# Patient Record
Sex: FEMALE | Race: OTHER | HISPANIC OR LATINO | URBAN - METROPOLITAN AREA
[De-identification: names, ages, dates, MRNs, and addresses within clinical notes are randomized per-mention and may not be internally consistent; named-entity substitution may affect disease eponyms.]

---
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
## 2024-01-01 NOTE — PATIENT PROFILE PEDIATRIC. - NS CRAFFT CAR ALCOHOL
(3) Alterations in Oxygenation (Respiratory Diagnosis, Dehydration, Anemia, Anorexia, Syncope/Dizziness, etc.)
No

## 2024-01-24 ENCOUNTER — RX CHANGE (OUTPATIENT)
Age: 19
End: 2024-01-24

## 2024-01-29 ENCOUNTER — APPOINTMENT (OUTPATIENT)
Dept: PEDIATRIC NEUROLOGY | Facility: CLINIC | Age: 19
End: 2024-01-29
Payer: MEDICAID

## 2024-01-29 PROCEDURE — 96137 PSYCL/NRPSYC TST PHY/QHP EA: CPT

## 2024-01-29 PROCEDURE — 96133 NRPSYC TST EVAL PHYS/QHP EA: CPT

## 2024-01-29 PROCEDURE — 96116 NUBHVL XM PHYS/QHP 1ST HR: CPT

## 2024-01-29 PROCEDURE — 96136 PSYCL/NRPSYC TST PHY/QHP 1ST: CPT

## 2024-01-29 PROCEDURE — 96132 NRPSYC TST EVAL PHYS/QHP 1ST: CPT

## 2024-02-05 ENCOUNTER — TRANSCRIPTION ENCOUNTER (OUTPATIENT)
Age: 19
End: 2024-02-05

## 2024-02-08 ENCOUNTER — TRANSCRIPTION ENCOUNTER (OUTPATIENT)
Age: 19
End: 2024-02-08

## 2024-02-12 ENCOUNTER — TRANSCRIPTION ENCOUNTER (OUTPATIENT)
Age: 19
End: 2024-02-12

## 2024-02-12 ENCOUNTER — INPATIENT (INPATIENT)
Age: 19
LOS: 3 days | Discharge: HOME | End: 2024-02-16
Attending: PEDIATRICS | Admitting: PEDIATRICS
Payer: MEDICAID

## 2024-02-12 VITALS
TEMPERATURE: 98 F | HEART RATE: 88 BPM | SYSTOLIC BLOOD PRESSURE: 117 MMHG | OXYGEN SATURATION: 99 % | WEIGHT: 102.96 LBS | DIASTOLIC BLOOD PRESSURE: 75 MMHG | RESPIRATION RATE: 18 BRPM

## 2024-02-12 DIAGNOSIS — R56.9 UNSPECIFIED CONVULSIONS: ICD-10-CM

## 2024-02-12 LAB
ALBUMIN SERPL ELPH-MCNC: 4.1 G/DL — SIGNIFICANT CHANGE UP (ref 3.3–5)
ALP SERPL-CCNC: 64 U/L — SIGNIFICANT CHANGE UP (ref 40–120)
ALT FLD-CCNC: 21 U/L — SIGNIFICANT CHANGE UP (ref 4–33)
ANION GAP SERPL CALC-SCNC: 10 MMOL/L — SIGNIFICANT CHANGE UP (ref 7–14)
AST SERPL-CCNC: 21 U/L — SIGNIFICANT CHANGE UP (ref 4–32)
BILIRUB SERPL-MCNC: <0.2 MG/DL — SIGNIFICANT CHANGE UP (ref 0.2–1.2)
BUN SERPL-MCNC: 11 MG/DL — SIGNIFICANT CHANGE UP (ref 7–23)
CALCIUM SERPL-MCNC: 8.9 MG/DL — SIGNIFICANT CHANGE UP (ref 8.4–10.5)
CHLORIDE SERPL-SCNC: 103 MMOL/L — SIGNIFICANT CHANGE UP (ref 98–107)
CO2 SERPL-SCNC: 24 MMOL/L — SIGNIFICANT CHANGE UP (ref 22–31)
CREAT SERPL-MCNC: 0.48 MG/DL — LOW (ref 0.5–1.3)
EGFR: 140 ML/MIN/1.73M2 — SIGNIFICANT CHANGE UP
GLUCOSE SERPL-MCNC: 84 MG/DL — SIGNIFICANT CHANGE UP (ref 70–99)
HCT VFR BLD CALC: 34.5 % — SIGNIFICANT CHANGE UP (ref 34.5–45)
HGB BLD-MCNC: 11.4 G/DL — LOW (ref 11.5–15.5)
MCHC RBC-ENTMCNC: 29.8 PG — SIGNIFICANT CHANGE UP (ref 27–34)
MCHC RBC-ENTMCNC: 33 GM/DL — SIGNIFICANT CHANGE UP (ref 32–36)
MCV RBC AUTO: 90.1 FL — SIGNIFICANT CHANGE UP (ref 80–100)
NRBC # BLD: 0 /100 WBCS — SIGNIFICANT CHANGE UP (ref 0–0)
NRBC # FLD: 0 K/UL — SIGNIFICANT CHANGE UP (ref 0–0)
PLATELET # BLD AUTO: 241 K/UL — SIGNIFICANT CHANGE UP (ref 150–400)
POTASSIUM SERPL-MCNC: 3.7 MMOL/L — SIGNIFICANT CHANGE UP (ref 3.5–5.3)
POTASSIUM SERPL-SCNC: 3.7 MMOL/L — SIGNIFICANT CHANGE UP (ref 3.5–5.3)
PROT SERPL-MCNC: 6.4 G/DL — SIGNIFICANT CHANGE UP (ref 6–8.3)
RBC # BLD: 3.83 M/UL — SIGNIFICANT CHANGE UP (ref 3.8–5.2)
RBC # FLD: 14.1 % — SIGNIFICANT CHANGE UP (ref 10.3–14.5)
SODIUM SERPL-SCNC: 137 MMOL/L — SIGNIFICANT CHANGE UP (ref 135–145)
WBC # BLD: 8.16 K/UL — SIGNIFICANT CHANGE UP (ref 3.8–10.5)
WBC # FLD AUTO: 8.16 K/UL — SIGNIFICANT CHANGE UP (ref 3.8–10.5)

## 2024-02-12 PROCEDURE — 99222 1ST HOSP IP/OBS MODERATE 55: CPT

## 2024-02-12 RX ORDER — ESLICARBAZEPINE ACETATE 800 MG/1
1600 TABLET ORAL DAILY
Refills: 0 | Status: DISCONTINUED | OUTPATIENT
Start: 2024-02-13 | End: 2024-02-13

## 2024-02-12 NOTE — H&P PEDIATRIC - NSHPREVIEWOFSYSTEMS_GEN_ALL_CORE
Gen: No fever, normal appetite  Eyes: No eye irritation or discharge  ENT: No ear pain, congestion, sore throat  Resp: No cough or trouble breathing  Cardiovascular: No chest pain or palpitation  Gastroenteric: No nausea/vomiting, diarrhea, constipation  :  No change in urine output; no dysuria  MS: No joint or muscle pain  Skin: No rashes  Neuro: No headache; + abnormal movements  Remainder negative, except as per the HPI

## 2024-02-12 NOTE — H&P PEDIATRIC - NSHPPHYSICALEXAM_GEN_ALL_CORE
GENERAL PHYSICAL EXAM  General:        Well nourished, no acute distress, interactive and talkative on exam  HEENT:         Normocephalic, atraumatic, clear conjunctiva, external ear normal, nasal mucosa normal, oral pharynx clear  Neck:            Supple, full range of motion, no nuchal rigidity  CV:               Warm and well perfused.  Respiratory:   Even, nonlabored breathing  Abdominal:    Soft, non tender, nondistended, no masses, no organomegaly  Extremities:    No joint swelling, erythema, tenderness; normal ROM, no contractures  Skin:              No rash, no neurocutaneous stigmata    NEUROLOGIC EXAM  Mental Status:     Oriented to person, place, and date; Good eye contact; follows simple commands  Cranial Nerves:    PERRL, EOMI, no facial asymmetry, V1-V3 intact , symmetric palate, tongue midline.   Muscle Strength:  Full strength 5/5, proximal and distal,  upper and lower extremities  Muscle Tone:       Normal tone  DTR:                    2+/4 Biceps, Brachioradialis, Triceps Bilateral;  2+/4  Patellar, Ankle bilateral. No clonus.  Babinski:              Plantar reflexes flexion bilaterally  Sensation:            Intact to pain, light touch, temperature and vibration throughout.  Coordination:       No dysmetria in finger to nose test bilaterally  Gait:                    Normal gait, normal tandem gait, normal toe walking, normal heel walking  Romberg:            Negative Romberg Colchicine Counseling:  Patient counseled regarding adverse effects including but not limited to stomach upset (nausea, vomiting, stomach pain, or diarrhea).  Patient instructed to limit alcohol consumption while taking this medication.  Colchicine may reduce blood counts especially with prolonged use.  The patient understands that monitoring of kidney function and blood counts may be required, especially at baseline. The patient verbalized understanding of the proper use and possible adverse effects of colchicine.  All of the patient's questions and concerns were addressed.

## 2024-02-12 NOTE — DISCHARGE NOTE PROVIDER - NSDCMRMEDTOKEN_GEN_ALL_CORE_FT
cholecalciferol 1000 intl units (25 mcg) oral tablet: 1 tab orally once a day while on oxcarbazepine. MDD:1 tab  Diastat AcuDial 20 mg rectal kit: 15 milligrams rectally as needed for seizures lasting longer than 3 to 5 minutes. MDD:15mg  folic acid 1 mg oral tablet: 1 tab(s) orally once a day MDD:1 tab  OXcarbazepine 300 mg oral tablet: Take 1 tab in the morning and 1.5 tabs at bedtime for 3 days, then 1.5 tabs twice daily for 1 week. If seizures persist increase to 1.5 tabs in the morning and 2 tabs at night for 1 week. If seizures persist increase to 2 tabs twice daily. MDD:4 tabs   Diastat AcuDial 20 mg rectal kit: 15 milligrams rectally as needed for seizures lasting longer than 3 to 5 minutes. MDD:15mg  eslicarbazepine 800 mg oral tablet: 2 tab(s) orally once a day

## 2024-02-12 NOTE — DISCHARGE NOTE PROVIDER - NSDCCPCAREPLAN_GEN_ALL_CORE_FT
PRINCIPAL DISCHARGE DIAGNOSIS  Diagnosis: Seizure  Assessment and Plan of Treatment: - Please follow up with neurology at your scheduled outpatient appointment. Please call if there are any questions.   - Please follow up with your Pediatrician in 24-48 hours after discharge from the hospital.   - Please return to the emergency department if patient has any seizure like activity, difficulty talking or walking, or any abnormal mental status concerning for a seizure.  CARE DURING SEIZURES — If you witness your child's seizure, it is important to prevent the child from harming him or herself.  - Place the child on their side to keep the throat clear and allow secretions (saliva or vomit) to drain. Do not try to stop the child's movements or convulsions. Do not put anything in the child's mouth, and do not try to hold the tongue. It is not possible to swallow the tongue, although some children may bite their tongue during a seizure, which can cause bleeding. If this happens, it usually does not cause serious harm.  - Keep an eye on a clock or watch.  - Move the child away from potential hazards, such as a stove, furniture, stairs, or traffic.  - Stay with the child until the seizure ends. Allow the child to sleep after the seizure if he/she is tired. Explain what happened and reassure the child that they are safe when they awaken.  SEIZURE PRECAUTIONS  - Avoid any activity that can result in a fall if the child has a seizure during the activity  - Avoid heights above 3 feet  - If the child is around water, in a tub, or swimming, make sure there is one person responsible for watching the child. If they have a seizure while swimming, they are at risk for drowning     PRINCIPAL DISCHARGE DIAGNOSIS  Diagnosis: Seizure  Assessment and Plan of Treatment: - Please follow up with neurology at your scheduled outpatient appointment. Please call if there are any questions.   - Please follow up with the neurosurgery team.  - Please follow up with your Pediatrician in 24-48 hours after discharge from the hospital.   - Please return to the emergency department if patient has any seizure like activity, difficulty talking or walking, or any abnormal mental status concerning for a seizure.  CARE DURING SEIZURES — If you witness your child's seizure, it is important to prevent the child from harming him or herself.  - Place the child on their side to keep the throat clear and allow secretions (saliva or vomit) to drain. Do not try to stop the child's movements or convulsions. Do not put anything in the child's mouth, and do not try to hold the tongue. It is not possible to swallow the tongue, although some children may bite their tongue during a seizure, which can cause bleeding. If this happens, it usually does not cause serious harm.  - Keep an eye on a clock or watch.  - Move the child away from potential hazards, such as a stove, furniture, stairs, or traffic.  - Stay with the child until the seizure ends. Allow the child to sleep after the seizure if he/she is tired. Explain what happened and reassure the child that they are safe when they awaken.  SEIZURE PRECAUTIONS  - Avoid any activity that can result in a fall if the child has a seizure during the activity  - Avoid heights above 3 feet  - If the child is around water, in a tub, or swimming, make sure there is one person responsible for watching the child. If they have a seizure while swimming, they are at risk for drowning

## 2024-02-12 NOTE — H&P PEDIATRIC - HISTORY OF PRESENT ILLNESS
Sommer is a 17yo RH girl with a history of focal epilepsy presenting for phase 1 vEEG study. Patient first started having seizure around the age of 7, she desribed them as breif changes in sensation to her right should, they were usually breif and she never brought it up to family or doctors. In 2021, patient has an episode of right sided stiffening resulting in a fall. Events used to be rare, but in the last 3 years, the frequency has picked up to 1-2 epispodes a day of varying intensity. Patient states that sometimes it is just the shoulder, but sometimes it will spread from the shoulder to her whole right side including her face. Since being diagnosed with focal epilepsy in 2021 patient has failed oxcarbazepine and oxtellar due to poor seizure control. She is currently on Aptiom 1600mg daily which she has noted has decreased the amount and severity of her seizures. Semiology is RUE and RLE sensory changes, stiffening and sometimes head can turn to the left lasting 10-15 seconds. Triggers are pressure to the right shoulder which can either spark a seizure or cause a small one to spread. 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 PET MRI from June demonstrated focal hypometabolism in the same area. Most recent EEG in december was normal. 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 Patient is developmentally normal, never received speech, PT/Ot. Patient with no recent illnesses or head traumas. Family history notable for an aneurysm in grandma. Patient seen by neuropsych pending report.

## 2024-02-12 NOTE — DISCHARGE NOTE PROVIDER - HOSPITAL COURSE
[employer] for authorization of approval for Bilateral L2, L3, L4 and L5 medial branch blocks cpt codes 91552-71, 28058-RV, 34177;LT, 24144-DC, 51205-BJ.   Approved with WEB ID   [id number] for one unit/DOS effective 03/02/21 to 04/16/21 leonela Sommer is a 17yo RH girl with a history of focal epilepsy presenting for phase 1 vEEG study. Patient first started having seizure around the age of 7, she desribed them as breif changes in sensation to her right should, they were usually breif and she never brought it up to family or doctors. In 2021, patient has an episode of right sided stiffening resulting in a fall. Events used to be rare, but in the last 3 years, the frequency has picked up to 1-2 epispodes a day of varying intensity. Patient states that sometimes it is just the shoulder, but sometimes it will spread from the shoulder to her whole right side including her face. Since being diagnosed with focal epilepsy in 2021 patient has failed oxcarbazepine and oxtellar due to poor seizure control. She is currently on Aptiom 1600mg daily which she has noted has decreased the amount and severity of her seizures. Semiology is RUE and RLE sensory changes, stiffening and sometimes head can turn to the left lasting 10-15 seconds. Triggers are pressure to the right shoulder which can either spark a seizure or cause a small one to spread. 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 PET MRI from June demonstrated focal hypometabolism in the same area. Most recent EEG in december was normal. 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 Patient is developmentally normal, never received speech, PT/Ot. Patient with no recent illnesses or head traumas. Family history notable for an aneurysm in grandma. Patient seen by neuropsych pending report.     Neuroscience Course (2/12-)  Patient arrived to the floor in stable condition. vEEG was hooked up on 2/12 and disconnected on ... EEG demonstrated...     On day of discharge, vital signs were reviewed and remained within acceptable range. The patient continued to tolerate oral intake with adequate output. The patient remained well-appearing, with no (new) concerning findings noted on physical exam. Care plan, expected course, anticipatory guidance, and strict return precautions discussed in great detail with caregivers, who endorsed understanding. Questions and concerns at the time were addressed. The patient was deemed stable for discharge home with recommended follow-up with their primary care physician in 1-2 days.     <<No medications indicated at time of discharge. Patient may resume all outpatient therapies without restrictions.>>     Discharge Vitals     Discharge Physical Sommer is a 17yo RH girl with a history of focal epilepsy presenting for phase 1 vEEG study. Patient first started having seizure around the age of 7, she desribed them as breif changes in sensation to her right should, they were usually breif and she never brought it up to family or doctors. In 2021, patient has an episode of right sided stiffening resulting in a fall. Events used to be rare, but in the last 3 years, the frequency has picked up to 1-2 epispodes a day of varying intensity. Patient states that sometimes it is just the shoulder, but sometimes it will spread from the shoulder to her whole right side including her face. Since being diagnosed with focal epilepsy in 2021 patient has failed oxcarbazepine and oxtellar due to poor seizure control. She is currently on Aptiom 1600mg daily which she has noted has decreased the amount and severity of her seizures. Semiology is RUE and RLE sensory changes, stiffening and sometimes head can turn to the left lasting 10-15 seconds. Triggers are pressure to the right shoulder which can either spark a seizure or cause a small one to spread. 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 PET MRI from June demonstrated focal hypometabolism in the same area. Most recent EEG in december was normal. 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 Patient is developmentally normal, never received speech, PT/Ot. Patient with no recent illnesses or head traumas. Family history notable for an aneurysm in grandma. Patient seen by neuropsych pending report.     Neuroscience Course (2/12-2/16)  Patient arrived to the floor in stable condition. vEEG was hooked up on 2/12 and disconnected on 2/15. EEG demonstrated multiple poorly localized electroclinical seizures, diagnostic of a focal epilepsy arising from possibly the left posterior quadrant. In addition, the EEG findings indicate a focal epileptic diathesis over the posterior vertex region consistent with the interictal manifestation of a focal epilepsy in the appropriate clinical setting. She experienced a 2-min GTC on 2/15 after being off aptiom for 24 hours. She was restarted on aptiom 1600 mg daily on 2/15 and observed for clinical seizure activity until discharge on 2/16. Plan to followup outpatient with neurology an neurosurgery.    On day of discharge, vital signs were reviewed and remained within acceptable range. The patient continued to tolerate oral intake with adequate output. The patient remained well-appearing, with no (new) concerning findings noted on physical exam. Care plan, expected course, anticipatory guidance, and strict return precautions discussed in great detail with caregivers, who endorsed understanding. Questions and concerns at the time were addressed. The patient was deemed stable for discharge home with recommended follow-up with their primary care physician in 1-2 days.     Medications: Aptiom 1600mg daily    Discharge Vitals   VS  T(C): 37 (02-16-24 @ 06:00)  HR: 66 (02-16-24 @ 06:00)  BP: 102/83 (02-16-24 @ 06:00)  RR: 18 (02-16-24 @ 06:00)  SpO2: 100% (02-16-24 @ 06:00)    Discharge Physical     Gen:  Alert and interactive, no acute distress  HEENT: Normocephalic, atraumatic; PERRLA, Moist mucosa; Oropharynx clear; Neck supple  Lymph: No significant lymphadenopathy  CV: Heart regular, normal S1/2, no murmurs; Extremities warm and well-perfused x4  Pulm: Lungs clear to auscultation bilaterally  GI: Abdomen non-distended; No organomegaly, no tenderness, no masses  Skin: No rash noted  Neuro: Alert; Normal tone; CN II-XII grossly intact, normal strength in all 4 extremities   Sommer is a 19yo RH girl with a history of focal epilepsy presenting for phase 1 vEEG study. Patient first started having seizure around the age of 7, she desribed them as breif changes in sensation to her right should, they were usually breif and she never brought it up to family or doctors. In 2021, patient has an episode of right sided stiffening resulting in a fall. Events used to be rare, but in the last 3 years, the frequency has picked up to 1-2 epispodes a day of varying intensity. Patient states that sometimes it is just the shoulder, but sometimes it will spread from the shoulder to her whole right side including her face. Since being diagnosed with focal epilepsy in 2021 patient has failed oxcarbazepine and oxtellar due to poor seizure control. She is currently on Aptiom 1600mg daily which she has noted has decreased the amount and severity of her seizures. Semiology is RUE and RLE sensory changes, stiffening and sometimes head can turn to the left lasting 10-15 seconds. Triggers are pressure to the right shoulder which can either spark a seizure or cause a small one to spread. 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 PET MRI from June demonstrated focal hypometabolism in the same area. Most recent EEG in december was normal. 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 Patient is developmentally normal, never received speech, PT/Ot. Patient with no recent illnesses or head traumas. Family history notable for an aneurysm in grandma. Patient seen by neuropsych pending report.     Neuroscience Course (2/12-2/16)  Patient arrived to the floor in stable condition. vEEG was hooked up on 2/12 and disconnected on 2/15. EEG demonstrated multiple poorly localized electroclinical seizures, diagnostic of a focal epilepsy arising from possibly the left posterior quadrant. In addition, the EEG findings indicate a focal epileptic diathesis over the posterior vertex region consistent with the interictal manifestation of a focal epilepsy in the appropriate clinical setting. She experienced a 2-min GTC on 2/15 after being off aptiom for 24 hours. She was restarted on aptiom 1600 mg daily on 2/15 and observed for clinical seizure activity until discharge on 2/16. Plan to followup outpatient with neurology an neurosurgery.    On day of discharge, vital signs were reviewed and remained within acceptable range. The patient continued to tolerate oral intake with adequate output. The patient remained well-appearing, with no (new) concerning findings noted on physical exam. Care plan, expected course, anticipatory guidance, and strict return precautions discussed in great detail with caregivers, who endorsed understanding. Questions and concerns at the time were addressed. The patient was deemed stable for discharge home with recommended follow-up with their primary care physician in 1-2 days.     Medications: Aptiom 1600mg daily    Discharge Vitals   VS  T(C): 37 (02-16-24 @ 06:00)  HR: 66 (02-16-24 @ 06:00)  BP: 102/83 (02-16-24 @ 06:00)  RR: 18 (02-16-24 @ 06:00)  SpO2: 100% (02-16-24 @ 06:00)    Discharge Physical     Gen:  Alert and interactive, no acute distress  HEENT: Normocephalic, atraumatic; PERRLA, Moist mucosa; Oropharynx clear; Neck supple  Lymph: No significant lymphadenopathy  CV: Heart regular, normal S1/2, no murmurs; Extremities warm and well-perfused x4  Pulm: Lungs clear to auscultation bilaterally  GI: Abdomen non-distended; No organomegaly, no tenderness, no masses  Skin: No rash noted  Neuro: Alert; Normal tone; CN II-XII grossly intact, normal strength in all 4 extremities    Interval history was reviewed with patient and/or family (caretakers) and/or nursing and/or house staff as appropriate. Neurological examination was performed.  Any paraclinical testing performed in the interval was reviewed including laboratory studies, electroencephalographic recordings and neuroimaging if performed. Diagnostic and treatment plan was discussed with patient, and/or family (caretakers),and/or house staff and/or nursing as appropriate.     I was physically present for key portions of the evaluation and management (E/M) service provided. I agree with the history, physical examination, assessment and plan as written. All edits/revisions/additions were made to the document.    Jerry West MD  Attending Physician  Pediatric Neurology/Epilepsy

## 2024-02-12 NOTE — H&P PEDIATRIC - ASSESSMENT
Sommer is a 19yo girl with history of focal epilepsy refractory to medications presenting for phase 1 vEEG study. Patient is currently managed on aptiom 1600mg daily with seizures occurring daily. Patient is clinically stable with a nonfocal neurologic examination. Patient with Left sided signal abnormality in the pars marginalis and focal hypometabolism signal  aberration in the left pars marginalis on PET MRI. Patient to be admitted for vEEG for surgical planning.     Plan:    Focal seizures:  [ ]Aptiom 1600mg daily at 10AM  [ ]Ativan 4mg prn for seizures lasting longer than 5 minutes  [ ]seizure precautions  [ ]1:1 supervision during admission  [ ]cont pulse ox  [ ]cardiac bedside monitor    FENGI:   [ ]regular diet    Patient discussed with Dr. West, Pediatric Neurology Attending  Plan not final until signed by attending

## 2024-02-12 NOTE — DISCHARGE NOTE PROVIDER - CARE PROVIDER_API CALL
Jerry West  Pediatric Neurology  [address] [facility], [address]  [place] [address]  Phone: [phone]  Fax: [phone]  Follow Up Time: 2 weeks

## 2024-02-13 PROCEDURE — 99232 SBSQ HOSP IP/OBS MODERATE 35: CPT

## 2024-02-13 PROCEDURE — 95720 EEG PHY/QHP EA INCR W/VEEG: CPT

## 2024-02-13 RX ORDER — ESLICARBAZEPINE ACETATE 800 MG/1
800 TABLET ORAL DAILY
Refills: 0 | Status: DISCONTINUED | OUTPATIENT
Start: 2024-02-13 | End: 2024-02-14

## 2024-02-13 RX ADMIN — ESLICARBAZEPINE ACETATE 800 MILLIGRAM(S): 800 TABLET ORAL at 10:57

## 2024-02-13 NOTE — EEG REPORT - NS EEG TEXT BOX
Patient Identifiers  Name: CHELO MICHELLE  : 04  Age: 18y Female    Start Time: 24 21:13  End Time: 24 08:00    History:      Phase I evaluation, pars marginalis FCD    Medications:   eslicarbazepine Oral Tab/Cap - Peds 1600 milliGRAM(s) Oral daily  LORazepam IV Push - Peds 4 milliGRAM(s) IV Push once PRN    ___________________________________________________________________________  Recording Technique:     The patient underwent continuous Video/EEG monitoring using a cable telemetry system [employer].  The EEG was recorded from 21 electrodes using the standard 10/20 placement, with EKG.  Time synchronized digital video recording was done simultaneously with EEG recording.    The EEG was continuously sampled on disk, and spike detection and seizure detection algorithms marked portions of the EEG for further analysis offline.  Video data was stored on disk for important clinical events (indicated by manual pushbutton) and for periods identified by the seizure detection algorithm, and analyzed offline.      Video and EEG data were reviewed by the electroencephalographer on a daily basis, and selected segments were archived on compact disc.      The patient was attended by an EEG technician for eight to ten hours per day.  Patients were observed by the epilepsy nursing staff 24 hours per day.  The epilepsy center neurologist was available in person or on call 24 hours per day during the period of monitoring.    ___________________________________________________________________________    Background in wakefulness:   The background activity during wakefulness was well organized and characterized by the presence of well-modulated 10-11 Hz rhythm that appeared symmetrically over both posterior hemispheres and was attenuated with eye opening. A normal anterior to posterior gradient was present.    Background in drowsiness/sleep:  As the patient became drowsy, there was an attenuation of the background and the appearance of widespread, irregular slower frequency activity.  Stage II sleep was marked by synchronous age appropriate spindles. Normal slow wave sleep was achieved.     Slowing:  No focal slowing was present. No generalized slowing was present.     Attenuation and Asymmetry: None.    Interictal Activity:  -Occasional, sleep potentiated posterior vertex (Cz/Pz) spikes with spread at times to the left central (C3) region.      Patient Events/ Ictal Activity: One electroclinical seizure was captured during this recording at 05:01:27. Onset of seizure was characterized by 2-3Hz Cz/Pz spiking for 6 seconds preceding a burst of low amplitude fast activity over the bilateral posterior hemispheres (T5/O1, T6/O2, Cz/Pz) for 2 seconds with subsequent diffuse muscle artifact on EEG correlating with an arousal from sleep and reaching for the button. On further clarification, patient endorses having a painful sensation over the RUE during this period.    Activation Procedures:  None.    EKG:  No clear abnormalities were noted.     Impression:  This is an abnormal EEG due to the following findings:   -One electroclinical seizure as described above, focal onset arising from posterior vertex.   -Occasional, sleep potentiated posterior vertex (Cz/Pz) spikes with spread at times to the left central (C3) region.     Clinical Correlation:  The findings of this EEG are diagnostic of a focal epilepsy arising from the posterior vertex region. In addition, the EEG findings indicate a focal epileptic diathesis over this posterior vertex region consistent with the interictal manifestation of a focal epilepsy in the appropriate clinical setting.     Salinas Kelly MD  PGY-6, Pediatric Epilepsy Fellow    ***THIS IS A PRELIMINARY FELLOW REPORT PENDING REVIEW WITH ATTENDING EPILEPTOLOGIST***     Patient Identifiers  Name: CHELO MICHELLE  : 04  Age: 18y Female    Start Time: 24 21:13  End Time: 24 08:00    History:      Phase I evaluation, pars marginalis FCD    Medications:   eslicarbazepine Oral Tab/Cap - Peds 1600 milliGRAM(s) Oral daily  LORazepam IV Push - Peds 4 milliGRAM(s) IV Push once PRN    ___________________________________________________________________________  Recording Technique:     The patient underwent continuous Video/EEG monitoring using a cable telemetry system [employer].  The EEG was recorded from 21 electrodes using the standard 10/20 placement, with EKG.  Time synchronized digital video recording was done simultaneously with EEG recording.    The EEG was continuously sampled on disk, and spike detection and seizure detection algorithms marked portions of the EEG for further analysis offline.  Video data was stored on disk for important clinical events (indicated by manual pushbutton) and for periods identified by the seizure detection algorithm, and analyzed offline.      Video and EEG data were reviewed by the electroencephalographer on a daily basis, and selected segments were archived on compact disc.      The patient was attended by an EEG technician for eight to ten hours per day.  Patients were observed by the epilepsy nursing staff 24 hours per day.  The epilepsy center neurologist was available in person or on call 24 hours per day during the period of monitoring.    ___________________________________________________________________________    Background in wakefulness:   The background activity during wakefulness was well organized and characterized by the presence of well-modulated 10-11 Hz rhythm that appeared symmetrically over both posterior hemispheres and was attenuated with eye opening. A normal anterior to posterior gradient was present.    Background in drowsiness/sleep:  As the patient became drowsy, there was an attenuation of the background and the appearance of widespread, irregular slower frequency activity.  Stage II sleep was marked by synchronous age appropriate spindles. Normal slow wave sleep was achieved.     Slowing:  No focal slowing was present. No generalized slowing was present.     Attenuation and Asymmetry: None.    Interictal Activity:  -Occasional, sleep potentiated posterior vertex (Cz/Pz) spikes with spread at times to the left central (C3) region.      Patient Events/ Ictal Activity: One electroclinical seizure was captured during this recording at 05:01:27. Onset of seizure was characterized by 2-3Hz Cz/Pz spiking for 6 seconds preceding a burst of low amplitude fast activity over the bilateral posterior hemispheres (T5/O1, T6/O2, Cz/Pz) for 2 seconds with subsequent diffuse muscle artifact on EEG correlating with an arousal from sleep and reaching for the button. On further clarification, patient endorses having a painful sensation over the RUE during this period.    Activation Procedures:  None.    EKG:  No clear abnormalities were noted.     Impression:  This is an abnormal EEG due to the following findings:   -One electroclinical seizure as described above, focal onset arising from posterior vertex.   -Occasional, sleep potentiated posterior vertex (Cz/Pz) spikes with spread at times to the left central (C3) region.     Clinical Correlation:  The findings of this EEG are diagnostic of a focal epilepsy arising from the posterior vertex region. In addition, the EEG findings indicate a focal epileptic diathesis over this posterior vertex region consistent with the interictal manifestation of a focal epilepsy in the appropriate clinical setting.     Salinas Kelly MD  PGY-6, Pediatric Epilepsy Fellow    Jerry West MD  Attending Physician   Pediatric Neurology/Epilepsy

## 2024-02-13 NOTE — PATIENT PROFILE PEDIATRIC - URINARY CATHETER
[address]    Name:  Dennys Carpenter  MR#:  [id number]  :  2010  ACCOUNT #:  [de-identified]  DATE OF SERVICE:  2020    Thank you for the opportunity to participate in the care of this patient. HISTORY OF PRESENT ILLNESS:  As you know, she is an 5year-old female who presented with some pitting edema which may be related to restrictive eating habits. I was consulted to discuss treatment options and diagnostic possibilities with the patient and her family. I met with both patient and her mother. They report that the patient began restricting sugar intake a few months ago and that this progressed to not eating her lunch. She has lost about 7 pounds in the interim. She is also pacing frequently. The patient specifically denies any concerns about being overweight. She states that she believes she is actually underweight and seems reasonably credible in this claim. She denies any clothing behaviors. She denies any stomach pain or other gastrointestinal distress when eating. The forgoing of the lunch began with a change in schedule at school which had her eating earlier. She also expressed concerns about not eating snacks because it may spoil her dinner. She also expressed rather unusual concerns about avoiding screens because they may \"interfere with her ability to concentrate. \"  She is anxious about doing well in her 6th grade and is afraid that screen time will disrupt her ability to concentrate. This is a substantial change in her previous pattern according to her mother as she is like most people these days had been spending quite a bit of time in front of the screen. There were no reports of self-injury. She denies any suicidal or homicidal thinking. There were no reports of suicidal threats. There are patterns of obsessional thinking. The patient will often sit in her room and \"think. \"  She becomes upset and agitated if her mother interrupts.   The patient denies that there is any thinking to prevent bad things from happening or such that she has to restart her thought patterns, but I suspect that this may not be entirely accurate. MENTAL STATUS EXAMINATION:  This is a thin, young female. She is cooperative, oriented. There is no agitation. Her mood is [place]. \"  Her affect is bright. She is engaging. Speech is normal in rate, tone, and volume. Thought process is linear, logical, and goal directed. Cognitive function is age appropriate. Memory is intact. DIAGNOSES:  Unspecified eating disorder, unspecified anxiety disorder, rule out obsessive-compulsive disorder, rule out anorexia nervosa. TREATMENT AND RECOMMENDATIONS:  Given the patient's lack of concern about being overweight, a classic diagnosis of anorexia is unlikely, although there are certainly elements of this. She also has a number of other anxiety problems but many of these are of an obsessional nature. Her mother expressed concerns about PANDAS which of course is on the differential, though certainly less likely than a normal anxiety disorder. The mother minimizes a family history of anxiety. We discussed some treatment options including therapy and medications. They are interested in pursuing at least therapy at this time. We discussed the risks and potential benefits of medications, and she was encouraged to speak up with a child psychiatrist or similar provider. I gave her several suggestions such as Dr. Viki Jarvis with [facility], practitioner in my group [facility] and Nella Alcantar with [facility]. Presently, there is no criteria for acute psychiatric hospitalization. Thank you for the consultation. Should you have further questions, please feel free to contact.       Cullen Simental MD      [id number]  D:  11/26/2020 18:59  T:  11/27/2020 0:53  JOB #:  [id number] no

## 2024-02-14 PROCEDURE — 99232 SBSQ HOSP IP/OBS MODERATE 35: CPT

## 2024-02-14 PROCEDURE — 95720 EEG PHY/QHP EA INCR W/VEEG: CPT

## 2024-02-14 RX ORDER — ESLICARBAZEPINE ACETATE 800 MG/1
1600 TABLET ORAL DAILY
Refills: 0 | Status: DISCONTINUED | OUTPATIENT
Start: 2024-02-15 | End: 2024-02-16

## 2024-02-14 NOTE — EEG REPORT - NS EEG TEXT BOX
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  The EEG was recorded from 21 electrodes using the standard 10/20 placement, with EKG.  Time synchronized digital video recording was done simultaneously with EEG recording.    The EEG was continuously sampled on disk, and spike detection and seizure detection algorithms marked portions of the EEG for further analysis offline.  Video data was stored on disk for important clinical events (indicated by manual pushbutton) and for periods identified by the seizure detection algorithm, and analyzed offline.      Video and EEG data were reviewed by the electroencephalographer on a daily basis, and selected segments were archived on compact disc.      The patient was attended by an EEG technician for eight to ten hours per day.  Patients were observed by the epilepsy nursing staff 24 hours per day.  The epilepsy center neurologist was available in person or on call 24 hours per day during the period of monitoring.    ___________________________________________________________________________    Background in wakefulness:   The background activity during wakefulness was well organized and characterized by the presence of well-modulated 10-11 Hz rhythm that appeared symmetrically over both posterior hemispheres and was attenuated with eye opening. A normal anterior to posterior gradient was present.    Background in drowsiness/sleep:  As the patient became drowsy, there was an attenuation of the background and the appearance of widespread, irregular slower frequency activity.  Stage II sleep was marked by synchronous age appropriate spindles. Normal slow wave sleep was achieved.     Slowing:  No focal slowing was present. No generalized slowing was present.     Attenuation and Asymmetry: None.    Interictal Activity:  -Occasional, sleep potentiated posterior vertex (Cz/Pz) spikes with spread at times to the left central (C3) region.      Patient Events/ Ictal Activity: Eight electroclinical seizures was captured during this recording. Onset of seizure was often poorly lateralizable, with low voltage fast activity present over the bilateral posterior quadrants (T5/O1, O2)  that evolve, at times, to 5-6Hz periodic discharges over these regions and subsequent presence of diffuse muscle artifact thereafter. These episodes correlated with right hemibody numbness or shoulder numbness.     Activation Procedures:  None.    EKG:  No clear abnormalities were noted.     Impression:  This is an abnormal EEG due to the following findings:   -Eight electroclinical seizures as described above, focal onset arising from the posterior hemisphere, possible prominence over the left posterior quadrant.   -Occasional, sleep potentiated posterior vertex (Cz/Pz) spikes with spread at times to the left central (C3) region.     Clinical Correlation:  The findings of this EEG are diagnostic of a focal epilepsy arising from possibly the left posterior quadrant. In addition, the EEG findings indicate a focal epileptic diathesis over the posterior vertex region consistent with the interictal manifestation of a focal epilepsy in the appropriate clinical setting.     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  The EEG was recorded from 21 electrodes using the standard 10/20 placement, with EKG.  Time synchronized digital video recording was done simultaneously with EEG recording.    The EEG was continuously sampled on disk, and spike detection and seizure detection algorithms marked portions of the EEG for further analysis offline.  Video data was stored on disk for important clinical events (indicated by manual pushbutton) and for periods identified by the seizure detection algorithm, and analyzed offline.      Video and EEG data were reviewed by the electroencephalographer on a daily basis, and selected segments were archived on compact disc.      The patient was attended by an EEG technician for eight to ten hours per day.  Patients were observed by the epilepsy nursing staff 24 hours per day.  The epilepsy center neurologist was available in person or on call 24 hours per day during the period of monitoring.    ___________________________________________________________________________    Background in wakefulness:   The background activity during wakefulness was well organized and characterized by the presence of well-modulated 10-11 Hz rhythm that appeared symmetrically over both posterior hemispheres and was attenuated with eye opening. A normal anterior to posterior gradient was present.    Background in drowsiness/sleep:  As the patient became drowsy, there was an attenuation of the background and the appearance of widespread, irregular slower frequency activity.  Stage II sleep was marked by synchronous age appropriate spindles. Normal slow wave sleep was achieved.     Slowing:  No focal slowing was present. No generalized slowing was present.     Attenuation and Asymmetry: None.    Interictal Activity:  -Occasional, sleep potentiated posterior vertex (Cz/Pz) spikes with spread at times to the left central (C3) region.      Patient Events/ Ictal Activity: Eight electroclinical seizures was captured during this recording. Seven of these seizures have poorly lateralizable onset, with diffuse muscle activity present during the seizures. These episodes correlated with right hemibody numbness or shoulder numbness. One electroclinical seizure (see 02:27) presented with low voltage fast activity present over the bilateral posterior quadrants (T5/O1, O2)  that evolved in amplitude and subsequent presence of diffuse muscle artifact thereafter correlating with her eyes opening, gaze to the left with dystonic posturing of the LUE and tonic stiffening of the RUE (figure of 4 posturing) prior to offset. Sensory symptoms were also endorsed during this episode.     Activation Procedures:  None.    EKG:  No clear abnormalities were noted.     Impression:  This is an abnormal EEG due to the following findings:   -Eight electroclinical seizures as described above, poorly localized and lateralized, possible prominence over the left posterior quadrant (based on 02:27 episode).    -Occasional, sleep potentiated posterior vertex (Cz/Pz) spikes with spread at times to the left central (C3) region.     Clinical Correlation:  The findings of this EEG are diagnostic of a focal epilepsy arising from possibly the left posterior quadrant. In addition, the EEG findings indicate a focal epileptic diathesis over the posterior vertex region consistent with the interictal manifestation of a focal epilepsy in the appropriate clinical setting.     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  The EEG was recorded from 21 electrodes using the standard 10/20 placement, with EKG.  Time synchronized digital video recording was done simultaneously with EEG recording.    The EEG was continuously sampled on disk, and spike detection and seizure detection algorithms marked portions of the EEG for further analysis offline.  Video data was stored on disk for important clinical events (indicated by manual pushbutton) and for periods identified by the seizure detection algorithm, and analyzed offline.      Video and EEG data were reviewed by the electroencephalographer on a daily basis, and selected segments were archived on compact disc.      The patient was attended by an EEG technician for eight to ten hours per day.  Patients were observed by the epilepsy nursing staff 24 hours per day.  The epilepsy center neurologist was available in person or on call 24 hours per day during the period of monitoring.    ___________________________________________________________________________    Background in wakefulness:   The background activity during wakefulness was well organized and characterized by the presence of well-modulated 10-11 Hz rhythm that appeared symmetrically over both posterior hemispheres and was attenuated with eye opening. A normal anterior to posterior gradient was present.    Background in drowsiness/sleep:  As the patient became drowsy, there was an attenuation of the background and the appearance of widespread, irregular slower frequency activity.  Stage II sleep was marked by synchronous age appropriate spindles. Normal slow wave sleep was achieved.     Slowing:  No focal slowing was present. No generalized slowing was present.     Attenuation and Asymmetry: None.    Interictal Activity:  -Occasional, sleep potentiated posterior vertex (Cz/Pz) spikes with spread at times to the left central (C3) region.      Patient Events/ Ictal Activity: Eight electroclinical seizures was captured during this recording. Seven of these seizures have poorly lateralizable onset, with diffuse muscle activity present during the seizures. These episodes correlated with right hemibody numbness or shoulder numbness. One electroclinical seizure (see 02:27) presented with low voltage fast activity present over the bilateral posterior quadrants (T5/O1, O2)  that evolved in amplitude and subsequent presence of diffuse muscle artifact thereafter correlating with her eyes opening, gaze to the left with dystonic posturing of the LUE and tonic stiffening of the RUE (figure of 4 posturing) prior to offset. Sensory symptoms were also endorsed during this episode.     Activation Procedures:  None.    EKG:  No clear abnormalities were noted.     Impression:  This is an abnormal EEG due to the following findings:   -Eight electroclinical seizures as described above, poorly localized and lateralized, possible prominence over the left posterior quadrant (based on 02:27 episode).    -Occasional, sleep potentiated posterior vertex (Cz/Pz) spikes with spread at times to the left central (C3) region.     Clinical Correlation:  The findings of this EEG are diagnostic of a focal epilepsy arising from possibly the left posterior quadrant. In addition, the EEG findings indicate a focal epileptic diathesis over the posterior vertex region consistent with the interictal manifestation of a focal epilepsy in the appropriate clinical setting.     Salinas Kelly MD  PGY-6, Pediatric Epilepsy Fellow    Jerry West MD  Attending Physician   Pediatric Neurology/Epilepsy

## 2024-02-15 ENCOUNTER — NON-APPOINTMENT (OUTPATIENT)
Age: 19
End: 2024-02-15

## 2024-02-15 PROCEDURE — 95720 EEG PHY/QHP EA INCR W/VEEG: CPT

## 2024-02-15 PROCEDURE — 99232 SBSQ HOSP IP/OBS MODERATE 35: CPT

## 2024-02-15 RX ORDER — ONDANSETRON 8 MG/1
4 TABLET, FILM COATED ORAL ONCE
Refills: 0 | Status: COMPLETED | OUTPATIENT
Start: 2024-02-15 | End: 2024-02-15

## 2024-02-15 RX ADMIN — ONDANSETRON 8 MILLIGRAM(S): 8 TABLET, FILM COATED ORAL at 21:50

## 2024-02-15 RX ADMIN — ESLICARBAZEPINE ACETATE 1600 MILLIGRAM(S): 800 TABLET ORAL at 10:11

## 2024-02-15 RX ADMIN — Medication 4 MILLIGRAM(S): at 06:40

## 2024-02-15 NOTE — PROGRESS NOTE PEDS - SUBJECTIVE AND OBJECTIVE BOX
Reason for Visit: Patient is a 18y old  Female who presents with a chief complaint of phase 1 vEEG study (14 Feb 2024 13:48)    Interval History/ROS: Patient experienced a GTC lasting 2 minutes this morning and was postictal afterward. She received 2 mg ativan IV push. otherwise doing well.    MEDICATIONS  (STANDING):  eslicarbazepine Oral Tab/Cap - Peds 1600 milliGRAM(s) Oral daily  LORazepam IV Push - Peds 2 milliGRAM(s) IV Push once    MEDICATIONS  (PRN):    Allergies    No Known Allergies    Intolerances          Vital Signs Last 24 Hrs  T(C): 37 (15 Feb 2024 13:35), Max: 37.2 (14 Feb 2024 22:04)  T(F): 98.6 (15 Feb 2024 13:35), Max: 98.9 (14 Feb 2024 22:04)  HR: 90 (15 Feb 2024 13:35) (73 - 117)  BP: 102/64 (15 Feb 2024 13:35) (102/64 - 114/72)  BP(mean): 74 (15 Feb 2024 13:35) (74 - 84)  RR: 16 (15 Feb 2024 13:35) (15 - 17)  SpO2: 100% (15 Feb 2024 13:35) (97% - 100%)    Parameters below as of 15 Feb 2024 09:27  Patient On (Oxygen Delivery Method): room air      Daily     Daily     GENERAL PHYSICAL EXAM  All physical exam findings normal, except for those marked:  General:	well nourished, not acutely or chronically ill-appearing, postictal  HEENT:	normocephalic, atraumatic, clear conjunctiva, external ear normal, nasal mucosa normal, oral pharynx clear  Neck:          supple, full range of motion, no nuchal rigidity  Cardiovascular:	regular rate and variability, normal S1, S2, no murmurs  Respiratory:	CTA B/L  Abdominal	:                    soft, ND, NT, bowel sounds present, no masses, no organomegaly  Extremities:	no joint swelling, erythema, tenderness; normal ROM, no contractures  Skin:		no rash    NEUROLOGIC EXAM  Mental Status:     Oriented to time/place/person; Good eye contact ; follow simple commands ;  Age appropriate language  and fund of  knowledge.  Cranial Nerves:   PERRL, EOMI, no facial asymmetry , V1-V3 intact , symmetric palate, tongue midline.   Eyes:			Normal: optic discs   Visual Fields:		Full visual field  Muscle Strength:	 Full strength 5/5, proximal and distal,  upper and lower extremities  Muscle Tone:	Normal tone  Deep Tendon Reflexes:         2+/4  : Biceps, Brachioradialis, Triceps Bilateral;  2+/4 : Pattelar, Ankle bilateral. No clonus.  Plantar Response:	Plantar reflexes flexion bilaterally  Sensation:		Intact to pain, light touch, temperature and vibration throughout.  Coordination/	No dysmetria in finger to nose test bilaterally  Cerebellum	  Tandem Gait/Romberg	deferred      Lab Results:                    EEG Results:        Imaging Studies:

## 2024-02-15 NOTE — PROGRESS NOTE PEDS - ASSESSMENT
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 Patient is currently managed on eslicabazepine 1600mg daily with seizures occurring daily. Patient is clinically stable with a nonfocal neurologic examination. Patient with left-sided signal abnormality in the pars marginalis and focal hypometabolism signal aberration in the left pars marginalis on PET MRI. Patient to be admitted for vEEG for surgical planning. Patient has had many typical seizures that have been captured, but definitive  lateralization on EEG has not been observed. Holding home meds completely for today to try capturing an event with clear lateralization.     Plan:    Focal seizures:  - Eslicarbazepine 1600mg daily at 10AM (home dose)    - dose reduced to 800mg 2/13    - dose held 2/14  - Ativan 4mg PRN for seizures lasting longer than 5 minutes  - seizure precautions  - 1:1 supervision during admission  - cont. pulse ox  - cardiac bedside monitor    FENGI:   - regular diet    Patient discussed with Dr. West, Pediatric Neurology Attending  Plan not final until signed by attending  
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 Patient is currently managed on eslicabazepine 1600mg daily with seizures occurring daily. Patient is clinically stable with a nonfocal neurologic examination. Patient with left-sided signal abnormality in the pars marginalis and focal hypometabolism signal aberration in the left pars marginalis on PET MRI. Patient to be admitted for vEEG for surgical planning. Patient has had many typical seizures that have been captured, with possible lateralization to the L posterior quadrant. She experienced a 2-min GTC this morning. Will resume Aptiom 1600 mg today, and will likely DC home tomorrow.    Plan:    Focal seizures:  - Restarting Eslicarbazepine 1600mg daily at 10AM (home dose)    - dose reduced to 800mg 2/13    - dose held 2/14  - Ativan 4mg PRN for seizures lasting longer than 5 minutes  - seizure precautions  - 1:1 supervision during admission  - cont. pulse ox  - cardiac bedside monitor    FENGI:   - regular diet    Patient discussed with Dr. West, Pediatric Neurology Attending  Plan not final until signed by attending

## 2024-02-15 NOTE — EEG REPORT - NS EEG TEXT BOX
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  The EEG was recorded from 21 electrodes using the standard 10/20 placement, with EKG.  Time synchronized digital video recording was done simultaneously with EEG recording.    The EEG was continuously sampled on disk, and spike detection and seizure detection algorithms marked portions of the EEG for further analysis offline.  Video data was stored on disk for important clinical events (indicated by manual pushbutton) and for periods identified by the seizure detection algorithm, and analyzed offline.      Video and EEG data were reviewed by the electroencephalographer on a daily basis, and selected segments were archived on compact disc.      The patient was attended by an EEG technician for eight to ten hours per day.  Patients were observed by the epilepsy nursing staff 24 hours per day.  The epilepsy center neurologist was available in person or on call 24 hours per day during the period of monitoring.    ___________________________________________________________________________    Background in wakefulness:   The background activity during wakefulness was well organized and characterized by the presence of well-modulated 10-11 Hz rhythm that appeared symmetrically over both posterior hemispheres and was attenuated with eye opening. A normal anterior to posterior gradient was present.    Background in drowsiness/sleep:  As the patient became drowsy, there was an attenuation of the background and the appearance of widespread, irregular slower frequency activity.  Stage II sleep was marked by synchronous age appropriate spindles. Normal slow wave sleep was achieved.     Slowing:  No focal slowing was present. No generalized slowing was present.     Attenuation and Asymmetry: None.    Interictal Activity:  -Occasional, sleep potentiated posterior vertex (Cz/Pz) spikes with spread at times to the left central (C3) region.      Patient Events/ Ictal Activity: Ten electroclinical seizures was captured during this recording. Ten of these seizures have poorly lateralizable onset, with diffuse muscle activity present during the seizures. These episodes correlated with right hemibody numbness or shoulder numbness followed by elevation and dystonic posturing of the RUE.  One electroclinical seizure (see 06:26) presented with diffuse muscle activity with subsequent generalization and associated with elevation and dystonic posturing of the RUE followed by superimposed clonic component and gaze deviation to the left and "figure of 4" posturing with ictal cry prior to offset lasting 124 seconds. One electrographic seizure characterized by Pz/O1 2-3Hz rhythmic delta with superimposed fast activity lasting 36 seconds without clinical correlate.     Activation Procedures:  None.    EKG:  No clear abnormalities were noted.     Impression:  This is an abnormal EEG due to the following findings:   -Eleven electroclinical seizures as described above, poorly localized and lateralized, possible prominence over the left posterior quadrant, and one electrographic seizure as described above arising from posterior midline-left occipital region.  -Occasional, sleep potentiated posterior vertex (Cz/Pz) spikes with spread at times to the left central (C3) region.     Clinical Correlation:  The findings of this EEG are diagnostic of a focal epilepsy arising from possibly the left posterior quadrant. In addition, the EEG findings indicate a focal epileptic diathesis over the posterior vertex region consistent with the interictal manifestation of a focal epilepsy in the appropriate clinical setting.     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  The EEG was recorded from 21 electrodes using the standard 10/20 placement, with EKG.  Time synchronized digital video recording was done simultaneously with EEG recording.    The EEG was continuously sampled on disk, and spike detection and seizure detection algorithms marked portions of the EEG for further analysis offline.  Video data was stored on disk for important clinical events (indicated by manual pushbutton) and for periods identified by the seizure detection algorithm, and analyzed offline.      Video and EEG data were reviewed by the electroencephalographer on a daily basis, and selected segments were archived on compact disc.      The patient was attended by an EEG technician for eight to ten hours per day.  Patients were observed by the epilepsy nursing staff 24 hours per day.  The epilepsy center neurologist was available in person or on call 24 hours per day during the period of monitoring.    ___________________________________________________________________________    Background in wakefulness:   The background activity during wakefulness was well organized and characterized by the presence of well-modulated 10-11 Hz rhythm that appeared symmetrically over both posterior hemispheres and was attenuated with eye opening. A normal anterior to posterior gradient was present.    Background in drowsiness/sleep:  As the patient became drowsy, there was an attenuation of the background and the appearance of widespread, irregular slower frequency activity.  Stage II sleep was marked by synchronous age appropriate spindles. Normal slow wave sleep was achieved.     Slowing:  No focal slowing was present. No generalized slowing was present.     Attenuation and Asymmetry: None.    Interictal Activity:  -Occasional, sleep potentiated posterior vertex (Cz/Pz) spikes with spread at times to the left central (C3) region.      Patient Events/ Ictal Activity: Ten electroclinical seizures was captured during this recording. Ten of these seizures have poorly lateralizable onset, with diffuse muscle activity present during the seizures. These episodes correlated with right hemibody numbness or shoulder numbness followed by elevation and dystonic posturing of the RUE.  One electroclinical seizure (see 06:26) presented with diffuse muscle activity with subsequent generalization and associated with elevation and dystonic posturing of the RUE followed by superimposed clonic component and gaze deviation to the left and "figure of 4" posturing with ictal cry prior to offset lasting 124 seconds. One electrographic seizure characterized by Pz/O1 2-3Hz rhythmic delta with superimposed fast activity lasting 36 seconds without clinical correlate.     Activation Procedures:  None.    EKG:  No clear abnormalities were noted.     Impression:  This is an abnormal EEG due to the following findings:   -Eleven electroclinical seizures as described above, poorly localized and lateralized, possible prominence over the left posterior quadrant, and one electrographic seizure as described above arising from posterior midline-left occipital region.  -Occasional, sleep potentiated posterior vertex (Cz/Pz) spikes with spread at times to the left central (C3) region.     Clinical Correlation:  The findings of this EEG are diagnostic of a focal epilepsy arising from possibly the left posterior quadrant. In addition, the EEG findings indicate a focal epileptic diathesis over the posterior vertex region consistent with the interictal manifestation of a focal epilepsy in the appropriate clinical setting.     Salinas Kelly MD  PGY-6, Pediatric Epilepsy Fellow    Jerry West MD  Attending Physician   Pediatric Neurology/Epilepsy

## 2024-02-15 NOTE — PROGRESS NOTE PEDS - ATTENDING COMMENTS
.timerv
Interval history was reviewed with patient and/or family (caretakers) and/or nursing and/or house staff as appropriate. Neurological examination was performed.  Any paraclinical testing performed in the interval was reviewed including laboratory studies, electroencephalographic recordings and neuroimaging if performed. Diagnostic and treatment plan was discussed with patient, and/or family (caretakers),and/or house staff and/or nursing as appropriate.

## 2024-02-16 ENCOUNTER — TRANSCRIPTION ENCOUNTER (OUTPATIENT)
Age: 19
End: 2024-02-16

## 2024-02-16 VITALS
SYSTOLIC BLOOD PRESSURE: 104 MMHG | TEMPERATURE: 99 F | RESPIRATION RATE: 16 BRPM | OXYGEN SATURATION: 99 % | DIASTOLIC BLOOD PRESSURE: 63 MMHG | HEART RATE: 77 BPM

## 2024-02-16 PROCEDURE — 99238 HOSP IP/OBS DSCHRG MGMT 30/<: CPT

## 2024-02-16 RX ORDER — DIAZEPAM 5 MG
15 TABLET ORAL
Qty: 5 | Refills: 1
Start: 2024-02-16

## 2024-02-16 RX ORDER — ESLICARBAZEPINE ACETATE 800 MG/1
2 TABLET ORAL
Refills: 0 | DISCHARGE
Start: 2024-02-16

## 2024-02-16 RX ADMIN — ESLICARBAZEPINE ACETATE 1600 MILLIGRAM(S): 800 TABLET ORAL at 11:25

## 2024-02-16 NOTE — DISCHARGE NOTE NURSING/CASE MANAGEMENT/SOCIAL WORK - PATIENT PORTAL LINK FT
You can access the FollowMyHealth Patient Portal offered by [facility] by registering at the following website: [email or web address]. By joining [facility]’s FollowMyHealth portal, you will also be able to view your health information using other applications (apps) compatible with our system.

## 2024-02-16 NOTE — DISCHARGE NOTE NURSING/CASE MANAGEMENT/SOCIAL WORK - FLU SEASON?
Yes... + LE and WBC on UA , neg nitrite and bacteria , reports symtpoms  - urine culture   - Macrobid

## 2024-02-16 NOTE — DISCHARGE NOTE NURSING/CASE MANAGEMENT/SOCIAL WORK - NSDCPEFALRISK_GEN_ALL_CORE
For information on Fall & Injury Prevention, visit: [email or web address] OR  [email or web address] OR  https://www.cdc.gov/steadi/patient.html

## 2024-02-16 NOTE — DISCHARGE NOTE NURSING/CASE MANAGEMENT/SOCIAL WORK - NSDPACMPNYOTHER_GEN_ALL_CORE
Please contact [facility] RN with questions or concerns following today's appointment: [phone].    Thank you!     self

## 2024-02-16 NOTE — DISCHARGE NOTE NURSING/CASE MANAGEMENT/SOCIAL WORK - MEDICATIONS RETURNED TO PATIENT
[facility]     Return OB Note     CC: Return OB      Subjective:  Ana Maria is a 33 year old  at 37w6d   Denies vaginal bleeding, loss of fluid, or pelvic cramping.   Active baby. Baby is pushing on bladder and bowel. 2 minimal headaches that resolved completely with tylenol. Significant LE swelling. no vision changes, calf/leg pain, or RUQ pain.      Objective:  /74 (BP Location: Right arm, Patient Position: Chair, Cuff Size: Adult Regular)   Pulse 96   Temp 98.5  F (36.9  C) (Tympanic)   Resp 18   Ht 1.524 m (5')   Wt 92.1 kg (203 lb)   LMP 2023   BMI 39.65 kg/m         Fundal Height 39 cm   bpm     Assessment/Plan:      33 year old  at 37w6d   1) Normal labs  2) NIPT - low risk, boy  3) PMH/OBHx problems:                - Hx of c/s x2; wants repeat - scheduled repeat with tubal for  with Yann                - Hx of Factor V Leiden and kidney clot; s/p MFM, on lovenox, discussed stopping the night before surgery and if she had labor symptoms. Discussed option of trantioning to heparin, but she would prefer not to do this. Plan lovenox ppx for 6 wks pp.                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   4) Factor V Leiden with kidney clot:  M consult done.  Since Ms. Espinoza does have a personal history of arterial thrombosis, although likely more due to fibromuscular dysplasia, antepartum anticoagulation is recommended and should be continued for six weeks postpartum.  In the postpartum period, we recommend prophylactic anticoagulation with subcutaneous LMWH, usually enoxaparin 40 mg every 24 hours.   yes Anticoagulation should be initiated 6-12 hours after delivery, depending on her bleeding, and should be continued for six weeks postpartum. If regional anesthesia is used, 4 hours should lapse after epidural catheter removal prior to administering anticoagulation. Plan to hold lovenox the night before her c/s. Will also hold if she starts to labor prior to scheduled repeat.   5) Signifiacnt LE swelling. HELLP labs obtained on 10/13/23 and were normal. Pre-e precautions reviewed. Plan repeat OB appt in one week to follow BPs closely. normal blood pressure today.   6) GBS positive     RTC 1 weeks, precautions reviewed     Michelle Grande PA-C

## 2024-02-26 ENCOUNTER — RX RENEWAL (OUTPATIENT)
Age: 19
End: 2024-02-26

## 2024-02-26 RX ORDER — MIDAZOLAM 5 MG/.1ML
5 SPRAY NASAL
Qty: 1 | Refills: 0 | Status: ACTIVE | COMMUNITY
Start: 2021-12-09 | End: 1900-01-01

## 2024-02-26 RX ORDER — ESLICARBAZEPINE ACETATE 400 MG/1
400 TABLET ORAL
Qty: 90 | Refills: 0 | Status: DISCONTINUED | COMMUNITY
Start: 2023-12-18 | End: 2024-02-26

## 2024-03-05 ENCOUNTER — APPOINTMENT (OUTPATIENT)
Dept: PEDIATRIC NEUROLOGY | Facility: CLINIC | Age: 19
End: 2024-03-05
Payer: MEDICAID

## 2024-03-05 PROCEDURE — 96133 NRPSYC TST EVAL PHYS/QHP EA: CPT | Mod: GT

## 2024-03-26 ENCOUNTER — APPOINTMENT (OUTPATIENT)
Dept: PEDIATRIC NEUROLOGY | Facility: CLINIC | Age: 19
End: 2024-03-26
Payer: MEDICAID

## 2024-03-26 VITALS
DIASTOLIC BLOOD PRESSURE: 76 MMHG | HEIGHT: 63 IN | WEIGHT: 101 LBS | BODY MASS INDEX: 17.89 KG/M2 | SYSTOLIC BLOOD PRESSURE: 118 MMHG | HEART RATE: 100 BPM

## 2024-03-26 DIAGNOSIS — G40.219 LOCALIZATION-RELATED (FOCAL) (PARTIAL) SYMPTOMATIC EPILEPSY AND EPILEPTIC SYNDROMES WITH COMPLEX PARTIAL SEIZURES, INTRACTABLE, W/OUT STATUS EPILEPTICUS: ICD-10-CM

## 2024-03-26 PROCEDURE — 99205 OFFICE O/P NEW HI 60 MIN: CPT

## 2024-03-26 RX ORDER — FOLIC ACID 1 MG/1
1 TABLET ORAL
Qty: 30 | Refills: 5 | Status: ACTIVE | COMMUNITY
Start: 2021-08-19 | End: 1900-01-01

## 2024-03-26 RX ORDER — MULTIVIT-MIN/FOLIC/VIT K/LYCOP 400-300MCG
50 MCG TABLET ORAL
Qty: 30 | Refills: 5 | Status: ACTIVE | COMMUNITY
Start: 2021-08-19 | End: 1900-01-01

## 2024-03-26 NOTE — CONSULT LETTER
[Dear  ___] : Dear  [unfilled], [Consult Letter:] : I had the pleasure of evaluating your patient, [unfilled]. [( Thank you for referring [unfilled] for consultation for _____ )] : Thank you for referring [unfilled] for consultation for [unfilled] [Please see my note below.] : Please see my note below. [Consult Closing:] : Thank you very much for allowing me to participate in the care of this patient.  If you have any questions, please do not hesitate to contact me. [Sincerely,] : Sincerely, [FreeTextEntry3] : Dr. Salinas Kelly, PGY-6 Pediatric Epilepsy Fellow  Dr. Jeff Jacobo Epileptologist

## 2024-03-26 NOTE — HISTORY OF PRESENT ILLNESS
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 Patient was discussed during MDC on 02/15/2024 with the decision reached to pursue a Phase II SEEG implantation over the L parietal region, involving the posterior cingulate, ACC, prison and extending frontally along that hemisphere with the goal to delineate the seizure network for resection (better option than DUSTY). Sommer was last seen with Dr. West in 12/2023 and had a Phase 1 admission in 02/2024.   Interval History:  Since discharge from the hospital in 02/2024, patient has continued to have daily seizures (FA) and none of her GAYLE seizures (only really occurs now with medication wean). Frequency remains daily with her FA. Sleeps late at 1AM and wakes up at 10AM. Currently looking for a job at [employer].   Current Treatment:  Aptiom (ESL) 1600mg BID (35mg/kg/day)  Seizure History Reviewed:  Seizure Description / Semiology -Focal Aware: tingling/numbness of R shoulder x seconds--> right body limp, sometimes falls, fully aware during episode occurring daily -Focal with impaired awareness- Posturing and/or weakness RUE occurring qweekly or every other week.  Failed Meds: [facility]  Genetics: [facility] Epilepsy panel with 5 VOUS, paternally inherited KCNH2  MRI 2021 Redemonstrated is subtle FLAIR hyperintense signal involving the cortex along the left pars marginalis. There is no associated enhancement. Differential includes sequelae of seizure or underlying focal cortical dysplasia. This can be further evaluated with PET/MRI.  PET 2023 Focal hypometabolism with associated blurring of the gray-white matter junction and accompanying T2/FLAIR hyperintensity in the left pars marginalis, findings consistent with focal cortical dysplasia. Correlation with seizure semiology and EEG findings is advised.  EEG 2021 -Seizures captured had semiology and associated EEG changes (as described above) suggestive of R frontal (figure 4 posturing) or L parietal lobe onset (sensation reported in R arm before observed movements). These were poorly lateralized electrographically with interictal activity and rare ictal activity localized at the vertex (Cz/Pz)  Phase I EEG 02/2024 -Interictal: Occasional, sleep potentiated posterior vertex (Cz/Pz) spikes with spread at times to the left central (C3) region. -Ictal: One electroclinical seizure was captured during this recording at 05:01:27. Onset of seizure was characterized by 2-3Hz Cz/Pz/C3 spiking for 6 seconds preceding a burst of low amplitude fast activity over the bilateral posterior hemispheres (T5/O1, T6/O2, Cz/Pz) for 2 seconds with subsequent diffuse muscle artifact on EEG correlating with an arousal from sleep and reaching for the button. On further clarification, patient endorses having a painful sensation over the RUE during this period.  Neuropsychological Testing: diffuse L hemisphere; verbal memory deficits, word finding, construction issues

## 2024-03-26 NOTE — DISCHARGE NOTE NURSING/CASE MANAGEMENT/SOCIAL WORK - NS TRANSFER RESPONSE BELONGING
Patient's Spouse: calling for medication refill.  Medication(s) set up as pending orders from medication list.  Preferred pharmacy set up and verified.    Patient told to check with pharmacy after 48 hours.  Patient was advised they would be notified only if there is a problem concerning the refill.        Patient has 4 pills left.    yes

## 2024-03-26 NOTE — PHYSICAL EXAM
[No dysmorphic facial features] : no dysmorphic facial features [Well-appearing] : well-appearing [No abnormal neurocutaneous stigmata or skin lesions] : no abnormal neurocutaneous stigmata or skin lesions [Straight] : straight [No deformities] : no deformities [Alert] : alert [Normal speech and language] : normal speech and language [de-identified] : respirations appear regular and unlabored  [de-identified] : pupils are equal, round and reactive to light. Visual fields were intact to confrontation. Eye movements were full with no nystagmus. Funduscopic exam revealed sharp disc margins. Facial sensation intact to touch and/or temperature. Facial strength was normal. Palate elevated symmetrically with phonation. Cephalic version and/or shoulder shrug exhibited normal strength. Tongue protruded in the midline. [de-identified] : abdomen does not appear distended  [de-identified] : no pronator drift was noted. Normal resistance to passive manipulation was demonstrated. Muscle strength was normal both proximally and distally. [de-identified] : 2+ and symmetrical at all tested locations. No ankle clonus. Plantar responses were flexor.  [de-identified] : casual gait was narrow based. Heel and toe walking were intact. Tandem gait was intact  [de-identified] : normal response to touch at all tested locations. Romberg negative  [de-identified] : finger to nose and heel-knee-shin movements were intact. Fast finger movements were brisk, rhythmic and symmetric

## 2024-03-26 NOTE — PLAN
[FreeTextEntry1] : [ ] Continue Aptiom 1600mg qHS (was taking in the AM, will do 800mg qhs for 1 night, skip AM dose and then restart as 1600mg qHS thereafter) [ ] Start CNB starter pack for goal of 100mg qHS [ ] Phase II evaluation (SEEG) with focus in L parietal region, extending to left hemisphere [ ] RTC 2 weeks after SEEG [ ] Will see Dr. Mann in 2 weeks.

## 2024-03-26 NOTE — REASON FOR VISIT
[Initial Consultation] : an initial consultation for [Seizure Disorder] : seizure disorder [Mother] : mother [[employer] ] : provided by [employer]   [Interpreters_IDNumber] : [id number], [id number] [Interpreters_FullName] : Ricardo Mark

## 2024-03-26 NOTE — QUALITY MEASURES
[Seizure frequency] : Seizure frequency: Yes [Etiology, seizure type, and epilepsy syndrome] : Etiology, seizure type, and epilepsy syndrome: Yes [Safety and education around seizures] : Safety and education around seizures: Yes [Side effects of anti-seizure medications] : Side effects of anti-seizure medications: Yes [Sudden unexpected death in epilepsy (SUDEP)] : Sudden unexpected death in epilepsy: Yes [Issues around driving] : Issues around driving: Yes [Screening for anxiety, depression] : Screening for anxiety, depression: Yes [Treatment-resistant epilepsy (every visit)] : Treatment-resistant epilepsy (every visit): Yes [Adherence to medication(s)] : Adherence to medication(s): Yes [25 Hydroxy Vitamin D level assessed and Vitamin D3 ordered] : 25 Hydroxy Vitamin D level assessed and Vitamin D3 ordered: Yes

## 2024-03-26 NOTE — ASSESSMENT
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 Patient was discussed during MDC on 02/15/2024 with the decision reached to pursue a Phase II SEEG implantation over the L parietal region, involving the posterior cingulate, ACC, long-term and extending frontally along that hemisphere with the goal to delineate the seizure network for resection (better option than DUSTY). Given the daily seizure burden, will add CNB to her regimen at this time as she prepares for Phase II evaluation. RTC 2 weeks after Phase II evaluation.

## 2024-03-27 ENCOUNTER — APPOINTMENT (OUTPATIENT)
Dept: PEDIATRIC NEUROLOGY | Facility: CLINIC | Age: 19
End: 2024-03-27

## 2024-03-27 LAB
25(OH)D3 SERPL-MCNC: 62.1 NG/ML
ALBUMIN SERPL ELPH-MCNC: 4.7 G/DL
ALP BLD-CCNC: 61 U/L
ALT SERPL-CCNC: 20 U/L
ANION GAP SERPL CALC-SCNC: 11 MMOL/L
AST SERPL-CCNC: 19 U/L
BILIRUB SERPL-MCNC: 0.2 MG/DL
BUN SERPL-MCNC: 11 MG/DL
CALCIUM SERPL-MCNC: 9.4 MG/DL
CHLORIDE SERPL-SCNC: 104 MMOL/L
CO2 SERPL-SCNC: 23 MMOL/L
CREAT SERPL-MCNC: 0.52 MG/DL
EGFR: 136 ML/MIN/1.73M2
GLUCOSE SERPL-MCNC: 87 MG/DL
POTASSIUM SERPL-SCNC: 4 MMOL/L
PROT SERPL-MCNC: 7.3 G/DL
SODIUM SERPL-SCNC: 139 MMOL/L

## 2024-04-04 LAB
MISCELLANEOUS TEST: NORMAL
PROC NAME: NORMAL

## 2024-04-11 RX ORDER — CENOBAMATE 12.5-25MG
14 X 12.5 MG & KIT ORAL
Qty: 1 | Refills: 0 | Status: ACTIVE | COMMUNITY
Start: 2024-03-26 | End: 1900-01-01

## 2024-04-11 RX ORDER — CENOBAMATE 50MG-100MG
14 X 50 MG & KIT ORAL
Qty: 1 | Refills: 0 | Status: ACTIVE | COMMUNITY
Start: 2024-03-26 | End: 1900-01-01

## 2024-04-26 ENCOUNTER — OUTPATIENT (OUTPATIENT)
Dept: OUTPATIENT SERVICES | Age: 19
LOS: 1 days | End: 2024-04-26

## 2024-04-26 ENCOUNTER — APPOINTMENT (OUTPATIENT)
Dept: MRI IMAGING | Facility: HOSPITAL | Age: 19
End: 2024-04-26
Payer: MEDICAID

## 2024-04-26 ENCOUNTER — APPOINTMENT (OUTPATIENT)
Dept: CT IMAGING | Facility: HOSPITAL | Age: 19
End: 2024-04-26

## 2024-04-26 DIAGNOSIS — G40.919 EPILEPSY, UNSPECIFIED, INTRACTABLE, WITHOUT STATUS EPILEPTICUS: ICD-10-CM

## 2024-04-26 PROCEDURE — 70496 CT ANGIOGRAPHY HEAD: CPT | Mod: 26

## 2024-04-26 PROCEDURE — 70553 MRI BRAIN STEM W/O & W/DYE: CPT | Mod: 26

## 2024-04-29 ENCOUNTER — TRANSCRIPTION ENCOUNTER (OUTPATIENT)
Age: 19
End: 2024-04-29

## 2024-05-01 ENCOUNTER — OUTPATIENT (OUTPATIENT)
Dept: OUTPATIENT SERVICES | Age: 19
LOS: 1 days | End: 2024-05-01

## 2024-05-01 VITALS
TEMPERATURE: 98 F | SYSTOLIC BLOOD PRESSURE: 107 MMHG | WEIGHT: 100.31 LBS | OXYGEN SATURATION: 98 % | HEART RATE: 86 BPM | HEIGHT: 62.95 IN | RESPIRATION RATE: 16 BRPM | DIASTOLIC BLOOD PRESSURE: 71 MMHG

## 2024-05-01 VITALS
TEMPERATURE: 98 F | WEIGHT: 102.96 LBS | SYSTOLIC BLOOD PRESSURE: 107 MMHG | OXYGEN SATURATION: 98 % | HEIGHT: 62.95 IN | RESPIRATION RATE: 16 BRPM | DIASTOLIC BLOOD PRESSURE: 71 MMHG | HEART RATE: 86 BPM

## 2024-05-01 DIAGNOSIS — G40.909 EPILEPSY, UNSPECIFIED, NOT INTRACTABLE, WITHOUT STATUS EPILEPTICUS: ICD-10-CM

## 2024-05-01 DIAGNOSIS — Z92.89 PERSONAL HISTORY OF OTHER MEDICAL TREATMENT: Chronic | ICD-10-CM

## 2024-05-01 DIAGNOSIS — G40.919 EPILEPSY, UNSPECIFIED, INTRACTABLE, WITHOUT STATUS EPILEPTICUS: ICD-10-CM

## 2024-05-01 LAB
ANION GAP SERPL CALC-SCNC: 10 MMOL/L — SIGNIFICANT CHANGE UP (ref 7–14)
BLD GP AB SCN SERPL QL: NEGATIVE — SIGNIFICANT CHANGE UP
BUN SERPL-MCNC: 11 MG/DL — SIGNIFICANT CHANGE UP (ref 7–23)
CALCIUM SERPL-MCNC: 9 MG/DL — SIGNIFICANT CHANGE UP (ref 8.4–10.5)
CHLORIDE SERPL-SCNC: 102 MMOL/L — SIGNIFICANT CHANGE UP (ref 98–107)
CO2 SERPL-SCNC: 25 MMOL/L — SIGNIFICANT CHANGE UP (ref 22–31)
CREAT SERPL-MCNC: 0.51 MG/DL — SIGNIFICANT CHANGE UP (ref 0.5–1.3)
EGFR: 137 ML/MIN/1.73M2 — SIGNIFICANT CHANGE UP
GLUCOSE SERPL-MCNC: 80 MG/DL — SIGNIFICANT CHANGE UP (ref 70–99)
HCG UR QL: NEGATIVE — SIGNIFICANT CHANGE UP
HCT VFR BLD CALC: 39.5 % — SIGNIFICANT CHANGE UP (ref 34.5–45)
HGB BLD-MCNC: 13.3 G/DL — SIGNIFICANT CHANGE UP (ref 11.5–15.5)
MCHC RBC-ENTMCNC: 30.4 PG — SIGNIFICANT CHANGE UP (ref 27–34)
MCHC RBC-ENTMCNC: 33.7 GM/DL — SIGNIFICANT CHANGE UP (ref 32–36)
MCV RBC AUTO: 90.4 FL — SIGNIFICANT CHANGE UP (ref 80–100)
NRBC # BLD: 0 /100 WBCS — SIGNIFICANT CHANGE UP (ref 0–0)
NRBC # FLD: 0 K/UL — SIGNIFICANT CHANGE UP (ref 0–0)
PLATELET # BLD AUTO: 282 K/UL — SIGNIFICANT CHANGE UP (ref 150–400)
POTASSIUM SERPL-MCNC: 3.9 MMOL/L — SIGNIFICANT CHANGE UP (ref 3.5–5.3)
POTASSIUM SERPL-SCNC: 3.9 MMOL/L — SIGNIFICANT CHANGE UP (ref 3.5–5.3)
RBC # BLD: 4.37 M/UL — SIGNIFICANT CHANGE UP (ref 3.8–5.2)
RBC # FLD: 13.8 % — SIGNIFICANT CHANGE UP (ref 10.3–14.5)
RH IG SCN BLD-IMP: POSITIVE — SIGNIFICANT CHANGE UP
SODIUM SERPL-SCNC: 137 MMOL/L — SIGNIFICANT CHANGE UP (ref 135–145)
WBC # BLD: 5.31 K/UL — SIGNIFICANT CHANGE UP (ref 3.8–10.5)
WBC # FLD AUTO: 5.31 K/UL — SIGNIFICANT CHANGE UP (ref 3.8–10.5)

## 2024-05-01 NOTE — H&P PST PEDIATRIC - SYMPTOMS
Denies Cardiac issues  Denies CP and Palps See HPI Denies recent fever and illness Mother reports  H/O chronic coughing 4 years ago, CXR was normal   Denies Asthma, Snoring and GLENNA  Denies SOB and CASTELLON none Wears glasses as needed Pediatric bleeding questionnaire performed which was negative for any personal or family bleeding concerns. Able to walk up a flight steps and walk 1-2 blocks with no problem/none

## 2024-05-01 NOTE — H&P PST PEDIATRIC - NSICDXPASTSURGICALHX_GEN_ALL_CORE_FT
PAST SURGICAL HISTORY:  History of dental surgery      PAST SURGICAL HISTORY:  History of dental surgery removal of Teeth for braces in 7th grade

## 2024-05-01 NOTE — H&P PST PEDIATRIC - NSICDXPASTMEDICALHX_GEN_ALL_CORE_FT
PAST MEDICAL HISTORY:  No pertinent past medical history      PAST MEDICAL HISTORY:  Epilepsy, unspecified, not intractable, without status epilepticus

## 2024-05-01 NOTE — H&P PST PEDIATRIC - COMMENTS
History  Mother 58 PreDM, C/S x2, no issues   Father 56 PreDM,   Siblings - All Healthy, 1 brother with alopecia   MGM - , Stomach cancer   MGF - , old age, bled easily   PGM - , Stomach cancer   PGF - , old age   Maternal Aunt - Pancreatic cancer Immunizations reportedly UTD.  No vaccines given in the last 2 weeks, educated parent on avoiding vaccines until 3 days after surgery.   Denies any recent travel.   Denies any known recent COVID19 exposure scheduled for sterostatic EEG with insertion of leads with JAMILA, electro    Dx with seizure disorder 4 years ago, reports daily seizure lasting a "few seconds" with nembess of the rigth side, denies LOC, reports a pulsing before seizures  Denies NICU, phototherapy and transfusions, discharged after 2 days     History  Mother 58 PreDM, C/S x2, no issues   Father 56 PreDM,   Siblings - All Healthy, 1 brother with alopecia, Twin Brother with Asthma, 1 brother nosebleeds as a child, resolved   MGM - , Stomach cancer   MGF - , old age, "bled easily", unknown origin   PGM - , Stomach cancer   PGF - , old age   Maternal Aunt - Pancreatic cancer 18 yo female presents to PST with Haitian only speaking mother, scheduled for stereotactic EEG with insertion of leads with JAMILA electrovectorcardiography stage 1 on 5/6 with Dr. Mann. Dx with seizure disorder 4 years ago, reports daily seizure,1-2 a day,  lasting a "few seconds" with numbness of her right side. Denies LOC, reports that she's awake during the seizures and reports a pulsing sensation before the seizures. Has had poor seizure control, currently on Aptiom 1600 mg daily.  18 yo female presents to PST with Bhutanese only speaking mother, scheduled for stereotactic EEG with insertion of leads with JAMILA electrovectorcardiography stage 1 on 5/6 with Dr. Mann. Dx with seizure disorder 4 years ago, reports daily seizure,1-2 a day,  lasting a "few seconds" with numbness of her right side. Denies LOC, reports that she's awake during the seizures and reports a pulsing sensation before having the seizures. Has had poor seizure control, currently on Aptiom 1600 mg daily.

## 2024-05-01 NOTE — H&P PST PEDIATRIC - REASON FOR ADMISSION
"Putting things in my brain, like EEG to record seizures" "Putting things in my brain, like an EEG to record seizures"

## 2024-05-01 NOTE — H&P PST PEDIATRIC - ANESTHESIA, PREVIOUS REACTION, PROFILE
Mother gets dizziness and nausea/none Was ok with dental surgery; Mother gets dizziness and nausea/none

## 2024-05-01 NOTE — H&P PST PEDIATRIC - PROBLEM SELECTOR PLAN 1
scheduled for stereotactic EEG with insertion of leads with JAMILA electrovectorcardiography stage 1 on 5/6 with Dr. Mann.

## 2024-05-01 NOTE — H&P PST PEDIATRIC - ASSESSMENT
Well appearing healthy Adult   No evidence of acute illness or infection.   CBC, BMP, T&D and UCG pending  Instructed to notify neurosurgeon any changes in health before procedure.  Pt seen by [facility] and all questions answered. Verbalized understanding.  Well appearing healthy Adult   No evidence of acute illness or infection.   CBC, BMP, T&S and UCG pending  Instructed to notify neurosurgeon any changes in health before procedure.  Pt seen by [facility] and all questions answered. Verbalized understanding.

## 2024-05-01 NOTE — H&P PST PEDIATRIC - HEENT
details Extra occular movements intact/Anicteric conjunctivae/No drainage/Normal tympanic membranes/External ear normal/Nasal mucosa normal/Normal dentition/No oral lesions/Normal oropharynx

## 2024-05-03 NOTE — ASU PATIENT PROFILE, ADULT - ANESTHESIA, PREVIOUS REACTION, PROFILE
Reason for Call:  Other call back    Detailed comments: patient came in and wanted to talk to some one about his meds, patient said can some one please call him ASAP     Phone Number Patient can be reached at: Home number on file [phone] (home)    Best Time: at 4pm     Can we leave a detailed message on this number? YES    Call taken on 10/14/2020 at 1:20 PM by Audra Bartlett     none

## 2024-05-05 ENCOUNTER — TRANSCRIPTION ENCOUNTER (OUTPATIENT)
Age: 19
End: 2024-05-05

## 2024-05-06 ENCOUNTER — INPATIENT (INPATIENT)
Age: 19
LOS: 3 days | Discharge: HOME | End: 2024-05-10
Attending: NEUROLOGICAL SURGERY | Admitting: NEUROLOGICAL SURGERY
Payer: MEDICAID

## 2024-05-06 ENCOUNTER — TRANSCRIPTION ENCOUNTER (OUTPATIENT)
Age: 19
End: 2024-05-06

## 2024-05-06 ENCOUNTER — RX RENEWAL (OUTPATIENT)
Age: 19
End: 2024-05-06

## 2024-05-06 ENCOUNTER — APPOINTMENT (OUTPATIENT)
Dept: MRI IMAGING | Facility: HOSPITAL | Age: 19
End: 2024-05-06

## 2024-05-06 VITALS
HEIGHT: 62.95 IN | SYSTOLIC BLOOD PRESSURE: 98 MMHG | HEART RATE: 80 BPM | OXYGEN SATURATION: 99 % | RESPIRATION RATE: 16 BRPM | TEMPERATURE: 98 F | DIASTOLIC BLOOD PRESSURE: 58 MMHG | WEIGHT: 100.31 LBS

## 2024-05-06 DIAGNOSIS — G40.919 EPILEPSY, UNSPECIFIED, INTRACTABLE, WITHOUT STATUS EPILEPTICUS: ICD-10-CM

## 2024-05-06 DIAGNOSIS — Z92.89 PERSONAL HISTORY OF OTHER MEDICAL TREATMENT: Chronic | ICD-10-CM

## 2024-05-06 LAB
HCG UR QL: NEGATIVE — SIGNIFICANT CHANGE UP
RH IG SCN BLD-IMP: POSITIVE — SIGNIFICANT CHANGE UP

## 2024-05-06 PROCEDURE — 70450 CT HEAD/BRAIN W/O DYE: CPT | Mod: 26,GC

## 2024-05-06 PROCEDURE — 70551 MRI BRAIN STEM W/O DYE: CPT | Mod: 26

## 2024-05-06 PROCEDURE — 99291 CRITICAL CARE FIRST HOUR: CPT

## 2024-05-06 DEVICE — ELECT 2 CONTACT STRIP: Type: IMPLANTABLE DEVICE | Site: LEFT | Status: FUNCTIONAL

## 2024-05-06 DEVICE — BONE WAX 2.5GM: Type: IMPLANTABLE DEVICE | Site: LEFT | Status: FUNCTIONAL

## 2024-05-06 DEVICE — ELECT 12 CONTACT DEPTH: Type: IMPLANTABLE DEVICE | Site: LEFT | Status: FUNCTIONAL

## 2024-05-06 DEVICE — BOLT ANCHOR 2.4X30MM: Type: IMPLANTABLE DEVICE | Site: LEFT | Status: FUNCTIONAL

## 2024-05-06 DEVICE — MAYFIELD SKULL PIN ADULT PLASTIC: Type: IMPLANTABLE DEVICE | Site: LEFT | Status: FUNCTIONAL

## 2024-05-06 DEVICE — ELECTRODE DEPTH: Type: IMPLANTABLE DEVICE | Site: LEFT | Status: FUNCTIONAL

## 2024-05-06 DEVICE — BOLT ANCHOR 35MM: Type: IMPLANTABLE DEVICE | Site: LEFT | Status: FUNCTIONAL

## 2024-05-06 DEVICE — ELECT 10 CONTACT DEPTH: Type: IMPLANTABLE DEVICE | Site: LEFT | Status: FUNCTIONAL

## 2024-05-06 DEVICE — ELECT 8 CONTACT SEEG DEPTH: Type: IMPLANTABLE DEVICE | Site: LEFT | Status: FUNCTIONAL

## 2024-05-06 RX ORDER — ESLICARBAZEPINE ACETATE 800 MG/1
800 TABLET ORAL AT BEDTIME
Refills: 0 | Status: COMPLETED | OUTPATIENT
Start: 2024-05-06 | End: 2024-05-06

## 2024-05-06 RX ORDER — SENNA PLUS 8.6 MG/1
10 TABLET ORAL AT BEDTIME
Refills: 0 | Status: DISCONTINUED | OUTPATIENT
Start: 2024-05-06 | End: 2024-05-10

## 2024-05-06 RX ORDER — ACETAMINOPHEN 500 MG
650 TABLET ORAL EVERY 6 HOURS
Refills: 0 | Status: DISCONTINUED | OUTPATIENT
Start: 2024-05-06 | End: 2024-05-09

## 2024-05-06 RX ORDER — ESLICARBAZEPINE ACETATE 800 MG/1
800 TABLET ORAL DAILY
Qty: 180 | Refills: 0 | Status: ACTIVE | COMMUNITY
Start: 2024-02-26 | End: 1900-01-01

## 2024-05-06 RX ORDER — CEFAZOLIN SODIUM 1 G
1370 VIAL (EA) INJECTION EVERY 8 HOURS
Refills: 0 | Status: COMPLETED | OUTPATIENT
Start: 2024-05-06 | End: 2024-05-07

## 2024-05-06 RX ORDER — SODIUM CHLORIDE 9 MG/ML
3 INJECTION INTRAMUSCULAR; INTRAVENOUS; SUBCUTANEOUS EVERY 6 HOURS
Refills: 0 | Status: DISCONTINUED | OUTPATIENT
Start: 2024-05-06 | End: 2024-05-09

## 2024-05-06 RX ADMIN — SODIUM CHLORIDE 3 MILLILITER(S): 9 INJECTION INTRAMUSCULAR; INTRAVENOUS; SUBCUTANEOUS at 12:30

## 2024-05-06 RX ADMIN — Medication 650 MILLIGRAM(S): at 21:47

## 2024-05-06 RX ADMIN — Medication 137 MILLIGRAM(S): at 16:20

## 2024-05-06 RX ADMIN — Medication 650 MILLIGRAM(S): at 12:46

## 2024-05-06 RX ADMIN — Medication 650 MILLIGRAM(S): at 13:15

## 2024-05-06 RX ADMIN — ESLICARBAZEPINE ACETATE 800 MILLIGRAM(S): 800 TABLET ORAL at 21:48

## 2024-05-06 RX ADMIN — Medication 650 MILLIGRAM(S): at 22:30

## 2024-05-06 NOTE — TRANSFER ACCEPTANCE NOTE - HISTORY OF PRESENT ILLNESS
INCOMPLETE    18 yo female PMHxof focal epilepsy presents for stage II epilepsy surgery workup with stereotactic EEG monitoring for event capture. Stage 1 on 5/6 with Dr. Mann. Semiology is RUE and RLE sensory changes, stiffening and sometimes head can turn to the left lasting 10-15 seconds.  Stereotactic EEG with insertion of leads with JAMILA, performed in OR on 5/6. Transferred to PICU for q1 neuro checks and EEG monitoring.    Vital Signs Last 24 Hrs  T(C): 36.9 (06 May 2024 06:11), Max: 36.9 (06 May 2024 06:11)  T(F): 98.4 (06 May 2024 06:11), Max: 98.4 (06 May 2024 06:11)  HR: 80 (06 May 2024 06:11) (80 - 80)  BP: 98/58 (06 May 2024 06:11) (98/58 - 98/58)  BP(mean): --  RR: 16 (06 May 2024 06:11) (16 - 16)  SpO2: 99% (06 May 2024 06:11) (99% - 99%)    MEDICATIONS  (STANDING):  ceFAZolin  IV Intermittent - Peds 1370 milliGRAM(s) IV Intermittent every 8 hours  sodium chloride 0.9% lock flush - Peds 3 milliLiter(s) IV Push every 6 hours    MEDICATIONS  (PRN):  acetaminophen   Oral Tab/Cap - Peds. 650 milliGRAM(s) Oral every 6 hours PRN Temp greater or equal to 38 C (100.4 F), Mild Pain (1 - 3), Moderate Pain (4 - 6), Severe Pain (7 - 10)  senna Oral Liquid - Peds 10 milliLiter(s) Oral at bedtime PRN Constipation    Adult Advanced Hemodynamics Last 24 Hrs  CVP(mm Hg): --  CVP(cm H2O): --  CO: --  CI: --  PA: --  PA(mean): --  PCWP: --  SVR: --  SVRI: --  PVR: --  PVRI: --     INCOMPLETE    20 yo female PMHxof focal epilepsy presents for stage II epilepsy surgery workup with stereotactic EEG monitoring for event capture. Stage 1 on 5/6 with Dr. Mann. Semiology is RUE and RLE sensory changes, stiffening and sometimes head can turn to the left lasting 10-15 seconds.  Stereotactic EEG with insertion of leads with JAMILA, performed in OR on 5/6. Transferred to PICU for q1 neuro checks and EEG monitoring.    OR Course: Insertion of 14 stereotactic EEG electrodes on Left side with 1 ground electrode. 600cc crystalloid given. 5cc EBL. Extubated and sinclair removed.     Vital Signs Last 24 Hrs  T(C): 36.9 (06 May 2024 06:11), Max: 36.9 (06 May 2024 06:11)  T(F): 98.4 (06 May 2024 06:11), Max: 98.4 (06 May 2024 06:11)  HR: 80 (06 May 2024 06:11) (80 - 80)  BP: 98/58 (06 May 2024 06:11) (98/58 - 98/58)  BP(mean): --  RR: 16 (06 May 2024 06:11) (16 - 16)  SpO2: 99% (06 May 2024 06:11) (99% - 99%)    MEDICATIONS  (STANDING):  ceFAZolin  IV Intermittent - Peds 1370 milliGRAM(s) IV Intermittent every 8 hours  sodium chloride 0.9% lock flush - Peds 3 milliLiter(s) IV Push every 6 hours    MEDICATIONS  (PRN):  acetaminophen   Oral Tab/Cap - Peds. 650 milliGRAM(s) Oral every 6 hours PRN Temp greater or equal to 38 C (100.4 F), Mild Pain (1 - 3), Moderate Pain (4 - 6), Severe Pain (7 - 10)  senna Oral Liquid - Peds 10 milliLiter(s) Oral at bedtime PRN Constipation    Adult Advanced Hemodynamics Last 24 Hrs  CVP(mm Hg): --  CVP(cm H2O): --  CO: --  CI: --  PA: --  PA(mean): --  PCWP: --  SVR: --  SVRI: --  PVR: --  PVRI: --     INCOMPLETE    18 yo female PMHxof focal epilepsy presents for stage II epilepsy surgery workup with stereotactic EEG monitoring for event capture. Stage 1 on 5/6 with Dr. Mann. Semiology is RUE and RLE sensory changes, stiffening and sometimes head can turn to the left lasting 10-15 seconds.  Stereotactic EEG with insertion of leads with JAMILA, performed in OR on 5/6. Transferred to PICU for q1 neuro checks and EEG monitoring.    OR Course: Insertion of 14 stereotactic EEG electrodes on Left side with 1 ground electrode. 600cc crystalloid given. 5cc EBL. Extubated and sinclair removed.     Vital Signs Last 24 Hrs  T(C): 36.9 (06 May 2024 06:11), Max: 36.9 (06 May 2024 06:11)  T(F): 98.4 (06 May 2024 06:11), Max: 98.4 (06 May 2024 06:11)  HR: 80 (06 May 2024 06:11) (80 - 80)  BP: 98/58 (06 May 2024 06:11) (98/58 - 98/58)  BP(mean): --  RR: 16 (06 May 2024 06:11) (16 - 16)  SpO2: 99% (06 May 2024 06:11) (99% - 99%)    MEDICATIONS  (STANDING):  ceFAZolin  IV Intermittent - Peds 1370 milliGRAM(s) IV Intermittent every 8 hours  sodium chloride 0.9% lock flush - Peds 3 milliLiter(s) IV Push every 6 hours    MEDICATIONS  (PRN):  acetaminophen   Oral Tab/Cap - Peds. 650 milliGRAM(s) Oral every 6 hours PRN Temp greater or equal to 38 C (100.4 F), Mild Pain (1 - 3), Moderate Pain (4 - 6), Severe Pain (7 - 10)  senna Oral Liquid - Peds 10 milliLiter(s) Oral at bedtime PRN Constipation     20 yo female PMHx of focal epilepsy presents for stage II epilepsy surgery workup with stereotactic EEG monitoring for event capture. Stage 1 on 5/6 with Dr. Mann. Semiology is RUE and RLE sensory changes, stiffening and sometimes head can turn to the left lasting 10-15 seconds.  Stereotactic EEG with insertion of leads with JAMILA, performed in OR on 5/6. Transferred to PICU for q1 neuro checks and EEG monitoring.    OR Course: Insertion of 14 stereotactic EEG electrodes on Left side with 1 ground electrode. 600cc crystalloid given. 5cc EBL. Extubated and sinclair removed.    20 yo female with focal epilepsy presents for stage II epilepsy surgery workup with stereotactic EEG monitoring for event capture. Stage 1 on 5/6 with Dr. Mann. Semiology is RUE and RLE sensory changes, stiffening and sometimes head can turn to the left lasting 10-15 seconds. Stereotactic EEG with insertion of leads with JAMILA, performed in OR on 5/6. Transferred to PICU for q1 neuro checks and EEG monitoring.    OR Course: Insertion of 14 stereotactic EEG electrodes on Left side with 1 ground electrode. 600cc crystalloid given. 5cc EBL. Extubated and sinclair removed.

## 2024-05-06 NOTE — PROGRESS NOTE PEDS - SUBJECTIVE AND OBJECTIVE BOX
Patient seen and examined s/p placement of 14 Left sEEG electrodes and ground electrode.     AOx3, EOMI, no facial, FCx4, BONDS antigravity    T(C): 36.9 (05-06-24 @ 06:11), Max: 36.9 (05-06-24 @ 06:11)  HR: 80 (05-06-24 @ 06:11) (80 - 80)  BP: 98/58 (05-06-24 @ 06:11) (98/58 - 98/58)  RR: 16 (05-06-24 @ 06:11) (16 - 16)  SpO2: 99% (05-06-24 @ 06:11) (99% - 99%)                    CAPILLARY BLOOD GLUCOSE

## 2024-05-06 NOTE — PATIENT PROFILE ADULT - NSPROGENPREVTRANSF_GEN_A_NUR
no
Follow-up with your pediatrician within 48 hours of discharge. Continue feeding child at least every 3 hours, wake baby to feed if needed. Please contact your pediatrician and return to the hospital if you notice any of the following:   - Fever  (T > 100.4)  - Reduced amount of wet diapers (< 5-6 per day) or no wet diaper in 12 hours  - Increased fussiness, irritability, or crying inconsolably  - Lethargy (excessively sleepy, difficult to arouse)  - Breathing difficulties (noisy breathing, increased work of breathing)  - Changes in the baby’s color (yellow, blue, pale, gray)  - Seizure or loss of consciousness

## 2024-05-06 NOTE — PROGRESS NOTE PEDS - ASSESSMENT
Patient seen and examined at bedside s/p sEEG, recovering well.   -PICU  -Q1H neurochecks  -Q1H vital signs  -Advance diet as tolerated  -Neurology evaluation for AED management

## 2024-05-06 NOTE — DISCHARGE NOTE PROVIDER - NSDCCPCAREPLAN_GEN_ALL_CORE_FT
PRINCIPAL DISCHARGE DIAGNOSIS  Diagnosis: Epilepsy, unspecified, not intractable, without status epilepticus  Assessment and Plan of Treatment:

## 2024-05-06 NOTE — DISCHARGE NOTE PROVIDER - NSDCFUADDINST_GEN_ALL_CORE_FT
- You had surgery on 5/6/24 placement of stereo EEG leads. On 5/10/24 you had removal of EEG leads.     - Remove bandage from incision site on post op day 3 if it was not removed by the surgical team prior to discharge. Once removed, incision site does not need a bandage or ointment on it. If you have steri strips (small, skinny beige strips), they will eventually fall off over time. Do not pull at steri strips. If steri strips are more than [facility] off, you may remove them. Do not touch or scratch incision to prevent infection.    - If your incision is closed with clear sutures, these are absorbable and will dissolve over time. If you see metallic staples or black stitches, these will need to be removed in the office 7-14 days after surgery. Your surgeon will tell you at your follow up appt when your sutures/staples will be removed, if applicable.  Do not pick or scratch at incision.     - Shower daily with shampoo/soap on post operative day 4 (5/14/24) Avoid long soaks and do not submerge incision in water (no baths.) Allow soap and water to run over the incision. Pat incision area dry with clean towel- do not scrub. Please shower regularly to ensure incision stays clean to avoid post operative infections.  You may have a body shower daily, as long as your head incision is covered by a shower cap and does not get wet until post op day 4.     - Notify your surgeon if you notice increased redness, drainage or your incision area opening.     - Return to ER immediately for high fevers, severe headache, vomiting, lethargy or weakness    - Please call your neurosurgeon following discharge to make follow up appointment in 1 week after discharge unless otherwise specified. See contact information.    - Prescription post operative medication has been sent to [facility] in the hospital. All post operative prescriptions should be picked up before departing the hospital. You can also take over the counter tylenol for pain as needed.     - Ambulate as tolerate. Continue with all "activities of daily living." Avoid strenuous activity or heavy lifting until cleared for additional activity at your follow up appointment. You cannot drive while taking narcotics (oxycodone, valium, etc.)     - Do not return to work or school until cleared by your neurosurgeon at your follow up visit unless specified to you during your hospital stay    - Do not take any blood thinning medications such as aspirin, motrin, ibuprofen, warfarin, coumadin, plavix, heparin, lovenox, Xarelto, Eliquis etc. until cleared by your neurosurgeon    - Surgery, anesthesia, and pain medications can cause constipation. Please take over the counter stool softeners daily until regular bowel movements are achieved. Examples include Miralax, Senna, Colace, Milk of Magnesia, or Dulcolax suppositories. Please consult drug store pharmacist or pediatrician if there are question about dosing if the patient is pediatric.

## 2024-05-06 NOTE — TRANSFER ACCEPTANCE NOTE - SKIN/BREAST

## 2024-05-06 NOTE — DISCHARGE NOTE PROVIDER - HOSPITAL COURSE
20 yo female PMHx of focal epilepsy presents for stage II epilepsy surgery workup with stereotactic EEG monitoring for event capture. Stage 1 on 5/6 with Dr. Mann. Semiology is RUE and RLE sensory changes, stiffening and sometimes head can turn to the left lasting 10-15 seconds.  Stereotactic EEG with insertion of leads with JAMILA, performed in OR on 5/6. Transferred to PICU for q1 neuro checks and EEG monitoring.    OR Course: Insertion of 14 stereotactic EEG electrodes on Left side with 1 ground electrode. 600cc crystalloid given. 5cc EBL. Extubated and sinclair removed.     PICU Course (5/6-  Resp:Remained stable on pulse ox   CV:HDS  Neuro: On VEEG after procedure. Taken off home CNB, weaned to 800mg eslicarbazepine on 5/6, off all seizure meds on 5/7.   FENGI: Reg diet   ID: Ancef x24 post procedure     20 yo female PMHx of focal epilepsy presents for stage II epilepsy surgery workup with stereotactic EEG monitoring for event capture. Stage 1 on 5/6 with Dr. Mann. Semiology is RUE and RLE sensory changes, stiffening and sometimes head can turn to the left lasting 10-15 seconds.  Stereotactic EEG with insertion of leads with JAMILA, performed in OR on 5/6. Transferred to PICU for q1 neuro checks and EEG monitoring.    OR Course 5/6: Insertion of 14 stereotactic EEG electrodes on Left side with 1 ground electrode. 600cc crystalloid given. 5cc EBL. Extubated and sinclair removed.     PICU Course (5/6-  Weaned off AEDs and had seizure activity 5/9. Patient given vimpat and restarted on home med in AM.     Resp:Remained stable on pulse ox   CV:HDS  Neuro: On VEEG after procedure. Taken off home CNB, weaned to 800mg eslicarbazepine on 5/6, off all seizure meds on 5/7. Seizure activity 5/9, given vimpat and 1600mg ESL in PM  FENGI: Reg diet   ID: Ancef x24 post procedure     20 yo female PMHx of focal epilepsy presents for stage II epilepsy surgery workup with stereotactic EEG monitoring for event capture. Stage 1 on 5/6 with Dr. Mann. Semiology is RUE and RLE sensory changes, stiffening and sometimes head can turn to the left lasting 10-15 seconds.  Stereotactic EEG with insertion of leads with JAMILA, performed in OR on 5/6. Transferred to PICU for q1 neuro checks and EEG monitoring.    OR Course 5/6: Insertion of 14 stereotactic EEG electrodes on Left side with 1 ground electrode. 600cc crystalloid given. 5cc EBL. Extubated and sinclair removed.     PICU Course (5/6 - 5/10)  Weaned off AEDs and had seizure activity 5/9. Patient given vimpat and restarted on home med in PM. Taken back to the OR 5/10 for lead removal.    Resp: Remained stable on pulse ox   CV:HDS  Neuro: On VEEG after procedure. Taken off home CNB, weaned to 800mg eslicarbazepine on 5/6, off all seizure meds on 5/7. Seizure activity 5/9, given vimpat and 1600mg ESL in PM  FENGI: Reg diet   ID: Ancef x24 post procedure     18 yo female PMHx of focal epilepsy presents for stage II epilepsy surgery workup with stereotactic EEG monitoring for event capture. Stage 1 on 5/6 with Dr. Mann. Semiology is RUE and RLE sensory changes, stiffening and sometimes head can turn to the left lasting 10-15 seconds.  Stereotactic EEG with insertion of leads with JAMILA, performed in OR on 5/6. Transferred to PICU for q1 neuro checks and EEG monitoring.    OR Course 5/6: Insertion of 14 stereotactic EEG electrodes on Left side with 1 ground electrode. 600cc crystalloid given. 5cc EBL. Extubated and sinclair removed.     PICU Course (5/6 - 5/10)  Weaned off AEDs and had seizure activity 5/9. Patient given vimpat and restarted on home med in PM. Taken back to the OR 5/10 for lead removal.    Resp: Remained stable on pulse ox   CV:HDS  Neuro: On VEEG after procedure. Taken off home CNB, weaned to 800mg eslicarbazepine on 5/6, off all seizure meds on 5/7. Seizure activity 5/9, given vimpat and 1600mg ESL in PM  FENGI: Reg diet   ID: Ancef x24 post procedure    SURGERIES:     5/6 SEEG placement   5/10 SEEG REMOVAL      20 yo female PMHx of focal epilepsy presents for stage II epilepsy surgery workup with stereotactic EEG monitoring for event capture. Stage 1 on 5/6 with Dr. Mann. Semiology is RUE and RLE sensory changes, stiffening and sometimes head can turn to the left lasting 10-15 seconds.  Stereotactic EEG with insertion of leads with JAMILA, performed in OR on 5/6. Transferred to PICU for q1 neuro checks and EEG monitoring.    OR Course 5/6: Insertion of 14 stereotactic EEG electrodes on Left side with 1 ground electrode. 600cc crystalloid given. 5cc EBL. Extubated and sinclair removed.     PICU Course (5/6 - 5/10)  Weaned off AEDs and had seizure activity 5/9. Patient given vimpat and restarted on home med in PM. Patient evaluated by neurology for localization of source of seizure. She was taken back to the OR 5/10 for lead removal. Per neurology, no changes to her home AED regimen.     Resp: Remained stable on pulse ox   CV: HDS  Neuro: On VEEG after procedure. Taken off home CNB, weaned to 800mg eslicarbazepine on 5/6, off all seizure meds on 5/7. Seizure activity 5/9, given vimpat and 1600mg ESL in PM  FENGI: Reg diet   ID: Ancef x24 post procedure    SURGERIES:   5/6 SEEG placement   5/10 SEEG REMOVAL     ICU Vital Signs Last 24 Hrs  T(C): 36.6 (10 May 2024 10:00), Max: 37.2 (09 May 2024 17:03)  T(F): 97.8 (10 May 2024 10:00), Max: 98.9 (09 May 2024 17:03)  HR: 77 (10 May 2024 10:00) (73 - 136)  BP: 109/63 (10 May 2024 10:00) (101/73 - 121/79)  BP(mean): 77 (10 May 2024 10:00) (77 - 92)  RR: 16 (10 May 2024 10:00) (15 - 19)  SpO2: 100% (10 May 2024 10:00) (92% - 100%)    O2 Parameters below as of 10 May 2024 10:00  Patient On (Oxygen Delivery Method): room air    GEN: NAD. A&Ox3. Non-toxic appearing.  HEENT: normocephalic, atraumatic, EOMI, PERRL, no scleral icterus, no conjuntival pallor, moist MM  CARDIAC: RRR, S1, S2, no murmur. Radial pulses and DP pulses present and symmetric b/l  PULM: CTA B/L no wheeze, rhonchi, rales.   ABD: soft NT, ND, no rebound no guarding  MSK: Moving all extremities, no edema. 5/5 strength and full ROM in all extremities.     NEURO: gait normal, no focal neurological deficits, CN 2-12 grossly intact  SKIN: warm, dry, no rash, prior electrode placement site c/d/i    On the day of discharge, the patient continued to tolerate PO intake with adequate UOP.  Vital signs were reviewed and remained WNL.  The child remained well-appearing, with no concerning findings noted on physical exam and no respiratory distress.  The care plan was reviewed with caregivers, who were in agreement and endorsed understanding.  The patient is deemed stable for discharge home with anticipatory guidance regarding when to return to the hospital and instructions for PMD follow-up in great detail.  There are no outstanding issues or concerns noted.

## 2024-05-06 NOTE — DISCHARGE NOTE PROVIDER - CARE PROVIDER_API CALL
Darrel Mann  Neurosurgery  [address] [place] [address] [address]  [place] [address]  Phone: [phone]  Fax: [phone]  Follow Up Time: 1 week

## 2024-05-06 NOTE — TRANSFER ACCEPTANCE NOTE - ASSESSMENT
20 yo female PMHxof focal epilepsy s/p insertion of leads with JAMILA for EEG monitoring 5/6, transferred to PICU for q1 neuro checks and EEG monitoring.    Neuro:  - s/p EEG electrode placement 5/6, tentative return to OR 5/10  - sEEG monitoring  - Home regimen: CNB 25mg qD, ESL 1600mg BID  - Wean home regimen as follows: 05/06 PM: CNB OFF, ESL 800mg, 05/07 AM: CNB and ESL OFF  - IV Ativan 4mg PRN for convulsive seizures > 5 minutes. Please notify the on-call Child Neurology resident PRIOR to administration of any rescue medication.  - q1h neurochecks  - PO tylenol PRN for fever/pain    Resp:  - on RA    CV:  - HDS    ID:  - Ancef x2 doses (5/6 -  )    FENGI:  - advance diet as tolerated    Heme:  - DVT prophylaxis: SCDs    Access:         20 yo female PMHxof focal epilepsy s/p insertion of leads with JAMILA for EEG monitoring 5/6, transferred to PICU for q1 neuro checks and EEG monitoring.    Neuro:  - s/p EEG electrode placement 5/6, tentative return to OR 5/10  - sEEG monitoring  - Home regimen: CNB 25mg qD, ESL 1600mg BID  - Wean home regimen as follows: 05/06 PM: CNB OFF, ESL 800mg, 05/07 AM: CNB and ESL OFF  - IV Ativan 4mg PRN for convulsive seizures > 5 minutes. Please notify the on-call Child Neurology resident PRIOR to administration of any rescue medication.  - q1h neurochecks  - PO tylenol PRN for fever/pain    Resp:  - on RA    CV:  - HDS    ID:  - Ancef x2 doses (5/6 -  )    FENGI:  - advance diet as tolerated    Heme:  - DVT prophylaxis: SCDs    Access:  - PIV x1       18 yo female PMHxof focal epilepsy s/p insertion of leads with JAMILA for EEG monitoring 5/6, transferred to PICU for q1 neuro checks and EEG monitoring.    Neuro:  - s/p EEG electrode placement 5/6, tentative return to OR 5/10  - sEEG monitoring  - Home regimen: CNB 25mg qD, ESL 1600mg BID  - Wean home regimen as follows: 05/06 PM: CNB OFF, ESL 800mg, 05/07 AM: CNB and ESL OFF  - IV Ativan 4mg PRN for only convulsive seizures > 5 minutes. Please notify the on-call Child Neurology resident PRIOR to administration of any rescue medication.  - q1h neurochecks  - PO tylenol PRN for fever/pain    Resp:  - on RA    CV:  - HDS    ID:  - Ancef x2 doses (5/6 -  )    FENGI:  - advance diet as tolerated    Heme:  - DVT prophylaxis: SCDs    Access:  - PIV x1

## 2024-05-06 NOTE — ASU PREOP CHECKLIST - HAIR REMOVAL
I independently performed the documented history, exam, and medical decision making.
hair removal not indicated

## 2024-05-06 NOTE — TRANSFER ACCEPTANCE NOTE - ATTENDING COMMENTS
Patient seen and examined on admission. Reviewed above and have edited where appropriate. Briefly, 19yoF with epilepsy here after JAMILA placement of stereotactic EEG leads. Remainder of history/exam/plan as above.

## 2024-05-06 NOTE — DISCHARGE NOTE PROVIDER - NSDCCPTREATMENT_GEN_ALL_CORE_FT
PRINCIPAL PROCEDURE  Procedure: Stereotactic implantation of depth electrodes  Findings and Treatment:       SECONDARY PROCEDURE  Procedure: Removal of SEEG electrode leads  Findings and Treatment:

## 2024-05-06 NOTE — ASU PREOP CHECKLIST - NOTHING BY MOUTH SINCE
Sunscreen Recommendation Label Override: Broad spectrum sunscreen SPF 30+ Nicotinamide Supplementation Recommendations: Heliocare Advanced with Nicotinamide (B3) Capsules. Take 2 capsules daily. Detail Level: Detailed Detail Level: Zone Detail Level: Simple Sunscreen Recommendation Label Override: Broad-spectrum Sunscreen SPF 30+ every day 06-May-2024 04:40

## 2024-05-06 NOTE — DISCHARGE NOTE PROVIDER - NSFOLLOWUPCLINICS_GEN_ALL_ED_FT
[facility]  Neurology  [address], [address]  [place] [id number]  Phone: [phone]  Fax:

## 2024-05-06 NOTE — DISCHARGE NOTE PROVIDER - NSDCMRMEDTOKEN_GEN_ALL_CORE_FT
eslicarbazepine 800 mg oral tablet: 2 tab(s) orally once a day (at bedtime)  FOLIC ACID 1 MG TABLET: TAKE 1 TABLET BY MOUTH EVERY DAY  NAYZILAM 5 MG NASAL SPRAY: PLEASE SEE ATTACHED FOR DETAILED DIRECTIONS  VITAMIN D2 1.25MG(50,000 UNIT): TAKE 1 CAPSULE BY MOUTH WEEKLY

## 2024-05-06 NOTE — CHART NOTE - NSCHARTNOTEFT_GEN_A_CORE
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 In short, her seizure semiologies included focal aware seizures characterized by paresthesias of right shoulder for seconds followed by right hemibody loss of tone, occurring daily, and possible focal impaired awareness seizures correlating with posturing and weakness of RUE occurring every other week. Her prior Phase I evaluation demonstrated interictal spikes arising from the posterior vertex (Cz/Pz) with left central extension and ictal EEG pattern of rhythmic spiking over this region with subsequent  low amplitude fast activity over the bilateral posterior hemispheres correlating with RUE painful sensation (her typical semiology). Given the concordance of data, SEEG implantation focused primarily within the left parietal cortex (somatosensory) and cingulate cortex, along with the contiguous regions to further delineate seizure onset.     Plan:   [ ] sEEG monitoring  [ ] Home regimen: CNB 25mg qD, ESL 1600mg BID  [ ] Wean home regimen as follows: 05/06 PM: CNB OFF, ESL 800mg, 05/07 AM: CNB and ESL OFF  [ ] IV Ativan 4mg PRN for convulsive seizures > 5 minutes. Please notify the on-call Child Neurology resident PRIOR to administration of any rescue medication. 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 In short, her seizure semiologies included focal aware seizures characterized by paresthesias of right shoulder for seconds followed by right hemibody loss of tone, occurring daily, and possible focal impaired awareness seizures correlating with posturing and weakness of RUE occurring every other week. Her prior Phase I evaluation demonstrated interictal spikes arising from the posterior vertex (Cz/Pz) with left central extension and ictal EEG pattern of rhythmic spiking over this region with subsequent  low amplitude fast activity over the bilateral posterior hemispheres correlating with RUE painful sensation (her typical semiology). Given the concordance of data, SEEG implantation focused primarily within the left parietal cortex (somatosensory) and cingulate cortex, along with the contiguous regions to further delineate seizure onset.     Leads:  1. Pars Marginalis Anterior- 10  2. Middle Temporal Amygdala- 12  3. Middle Temporal Hippocampal Head- 12  4. Middle Temporal Hippocampal Body- 12  5. Posterior Temporal- 10  6. Anterior Parietal- 14  7. Middle Frontal Anterior Cingulate- 12  8. Middle Frontal Middle Cingulate- 12  9. Middle Frontal Posterior Cingulate- 14  10. Pars Marginalis Posterior- 8  11. Pars Marginalis Superior- 8  12. Pars Marginalis Inferior- 10  13. Superior Insula- 8  14. Inferior Insula- 10  EKG 2  Total Leads: 154     Montages:  Page 1: ALL  Page 2: Temporal 1-5  Page 3: Cinguloparietal 6-9  Page 4: Pars Marginalis 10-12  Page 5: Insula 13, 14    Plan:   [ ] sEEG monitoring  [ ] Home regimen: CNB 25mg qD, ESL 1600mg BID  [ ] Wean home regimen as follows: 05/06 PM: CNB OFF, ESL 800mg, 05/07 AM: CNB and ESL OFF  [ ] IV Ativan 4mg PRN for convulsive seizures > 5 minutes. Please notify the on-call Child Neurology resident PRIOR to administration of any rescue medication.

## 2024-05-07 PROCEDURE — 99291 CRITICAL CARE FIRST HOUR: CPT

## 2024-05-07 PROCEDURE — 95720 EEG PHY/QHP EA INCR W/VEEG: CPT | Mod: 1L

## 2024-05-07 RX ORDER — ACETAMINOPHEN 500 MG
650 TABLET ORAL ONCE
Refills: 0 | Status: COMPLETED | OUTPATIENT
Start: 2024-05-07 | End: 2024-05-07

## 2024-05-07 RX ORDER — FLUTICASONE PROPIONATE 50 MCG
1 SPRAY, SUSPENSION NASAL ONCE
Refills: 0 | Status: COMPLETED | OUTPATIENT
Start: 2024-05-07 | End: 2024-05-07

## 2024-05-07 RX ORDER — CETIRIZINE HYDROCHLORIDE 10 MG/1
10 TABLET ORAL DAILY
Refills: 0 | Status: DISCONTINUED | OUTPATIENT
Start: 2024-05-07 | End: 2024-05-07

## 2024-05-07 RX ADMIN — CETIRIZINE HYDROCHLORIDE 10 MILLIGRAM(S): 10 TABLET ORAL at 16:38

## 2024-05-07 RX ADMIN — Medication 650 MILLIGRAM(S): at 23:00

## 2024-05-07 RX ADMIN — Medication 650 MILLIGRAM(S): at 11:00

## 2024-05-07 RX ADMIN — Medication 650 MILLIGRAM(S): at 18:27

## 2024-05-07 RX ADMIN — Medication 650 MILLIGRAM(S): at 17:57

## 2024-05-07 RX ADMIN — SODIUM CHLORIDE 3 MILLILITER(S): 9 INJECTION INTRAMUSCULAR; INTRAVENOUS; SUBCUTANEOUS at 12:00

## 2024-05-07 RX ADMIN — Medication 137 MILLIGRAM(S): at 00:14

## 2024-05-07 RX ADMIN — Medication 650 MILLIGRAM(S): at 05:51

## 2024-05-07 RX ADMIN — Medication 650 MILLIGRAM(S): at 05:21

## 2024-05-07 RX ADMIN — Medication 1 SPRAY(S): at 18:07

## 2024-05-07 RX ADMIN — Medication 650 MILLIGRAM(S): at 22:15

## 2024-05-07 RX ADMIN — Medication 650 MILLIGRAM(S): at 11:30

## 2024-05-07 RX ADMIN — SODIUM CHLORIDE 3 MILLILITER(S): 9 INJECTION INTRAMUSCULAR; INTRAVENOUS; SUBCUTANEOUS at 00:29

## 2024-05-07 RX ADMIN — SODIUM CHLORIDE 3 MILLILITER(S): 9 INJECTION INTRAMUSCULAR; INTRAVENOUS; SUBCUTANEOUS at 06:00

## 2024-05-07 RX ADMIN — SODIUM CHLORIDE 3 MILLILITER(S): 9 INJECTION INTRAMUSCULAR; INTRAVENOUS; SUBCUTANEOUS at 17:53

## 2024-05-07 NOTE — PROGRESS NOTE PEDS - SUBJECTIVE AND OBJECTIVE BOX
Interval/Overnight Events: no seizures  _________________________________________________________________  Respiratory:  RA  _________________________________________________________________  Cardiac:  Cardiac Rhythm: Sinus rhythm  ________________________________________________________________  Fluids/Electrolytes/Nutrition:  I&O's Summary    06 May 2024 07:01  -  07 May 2024 07:00  --------------------------------------------------------  IN: 666.5 mL / OUT: 2150 mL / NET: -1483.5 mL    Diet: regular  senna Oral Liquid - Peds 10 milliLiter(s) Oral at bedtime PRN  sodium chloride 0.9% lock flush - Peds 3 milliLiter(s) IV Push every 6 hours  _________________________________________________________________  Neurologic:  Adequacy of sedation and pain control has been assessed and adjusted  acetaminophen   Oral Tab/Cap - Peds. 650 milliGRAM(s) Oral every 6 hours PRN  LORazepam IV Push - Peds 4 milliGRAM(s) IV Push once PRN  ________________________________________________________________  Access: See plan  Necessity of urinary, arterial, and venous catheters discussed  _________________________________________________________________  PE:  T(C): 37 (05-07-24 @ 08:00), Max: 37.4 (05-06-24 @ 23:03)  HR: 71 (05-07-24 @ 09:00) (71 - 117)  BP: 97/57 (05-07-24 @ 09:00) (97/56 - 117/86)  RR: 13 (05-07-24 @ 09:00) (12 - 19)  SpO2: 99% (05-07-24 @ 09:00) (96% - 100%)    General:	No distress  Respiratory:      Effort even and unlabored. Clear bilaterally.   CV:                   Regular rate and rhythm. Normal S1/S2. No murmurs, rubs, or   .                       gallop. Capillary refill < 2 seconds. Distal pulses 2+ and equal.  Abdomen:	Soft, non-distended.  Skin:		No rashes.  Extremities:	Warm and well perfused.   Neurologic:	Alert.  No acute change from baseline exam.  ________________________________________________________________  Patient and Parent/Guardian was updated as to the progress/plan of care.    The patient remains in critical and unstable condition, and requires ICU care and monitoring. Total critical care time spent by attending physician was 35 minutes, excluding procedure time.

## 2024-05-07 NOTE — PROGRESS NOTE PEDS - SUBJECTIVE AND OBJECTIVE BOX
SUBJECTIVE EVENTS: doing well   Vital Signs Last 24 Hrs  T(C): 37 (07 May 2024 05:00), Max: 37.4 (06 May 2024 23:03)  T(F): 98.6 (07 May 2024 05:00), Max: 99.3 (06 May 2024 23:03)  HR: 82 (07 May 2024 06:00) (72 - 117)  BP: 107/72 (07 May 2024 06:00) (97/56 - 117/86)  BP(mean): 82 (07 May 2024 06:00) (69 - 94)  RR: 16 (07 May 2024 06:00) (12 - 19)  SpO2: 97% (07 May 2024 06:00) (96% - 100%)    Parameters below as of 07 May 2024 06:00  Patient On (Oxygen Delivery Method): room air          PHYSICAL EXAM:  Awake Alert Age Appopriate  PERRL, EOMI, No facial droop, Tongue midline  Normal Tone 5/5 strength equally  Head wrap in place    DIET:      MEDICATIONS  (STANDING):  sodium chloride 0.9% lock flush - Peds 3 milliLiter(s) IV Push every 6 hours    MEDICATIONS  (PRN):  acetaminophen   Oral Tab/Cap - Peds. 650 milliGRAM(s) Oral every 6 hours PRN Temp greater or equal to 38 C (100.4 F), Mild Pain (1 - 3), Moderate Pain (4 - 6), Severe Pain (7 - 10)  LORazepam IV Push - Peds 4 milliGRAM(s) IV Push once PRN seizure  senna Oral Liquid - Peds 10 milliLiter(s) Oral at bedtime PRN Constipation                RADIOLGY:   < from: CT Head No Cont in OR (05.06.24 @ 13:00) >    IMPRESSION:    There has been interval placement of multiple left-sided stereotactic   intracranial EEG leads, with tips in the left frontal, parietal, and   temporal locations.    < end of copied text >

## 2024-05-07 NOTE — PROGRESS NOTE PEDS - SUBJECTIVE AND OBJECTIVE BOX
{\rtf1\rmecnd26408\ansi\yvqvlas0321\ftnbj\uc1\deff0  {\fonttbl{\f0 \fnil Segoe UI;}{\f1 \fnil \fcharset0 Segoe UI;}{\f2 \fnil Times New Gul[place];}}  {\colortbl ;\pdh042\wthyu657\lbla233 ;\red0\green0\blue0 ;\red0\green0\fkpu296 ;\red0\green0\blue0 ;}  {\stylesheet{\f0\fs20 Normal;}{\cs1 Default Paragraph Font;}{\cs2\f0\fs16 Line Number;}{\cs3\f2\fs24\ul\cf3 Hyperlink;}}  {\*\revtbl{Unknown;}}  \ibzfik83607\cxkjxq95072\oaufa0668\jfiox2716\hggeu9526\njhbd2056\jiqaalo757\witnufp729\nogrowautofit\ssjioc210\formshade\nofeaturethrottle1\dntblnsbdb\fet4\aendnotes\aftnnrlc\pgbrdrhead\pgbrdrfoot  \sectd\uhrwqj56067\rrzrqz69192\guttersxn0\qvahjidr3138\vkuxeqwe1581\bnbzguld1200\yaoqahdw1054\kjzimjo894\xadlvfe890\sbkpage\pgncont\pgndec  \plain\plain\f0\fs24\pard\plain\f0\fs24\plain\f0\fs20\yhkd9462\hich\f0\dbch\f0\loch\f0\fs20 Reason for Visit: Patient is a 18y old  Female who presents with a chief complaint of phase 1 vEEG study (14 Feb 2024 13:48)\par  \par  Interval History/ROS: No sz overnight. Off of ASM as of this AM. \par  \par  MEDICATIONS  (STANDING):\par  \par  \par  MEDICATIONS  (PRN):\par  \par  Allergies\par  \par  No Known Allergies\par  \par  Intolerances\par  \par  \par  \par  \par  Vital Signs Last 24 Hrs\par  T(C): 36.9 (07 May 2024 14:00), Max: 37.4 (06 May 2024 23:03)\par  T(F): 98.4 (07 May 2024 14:00), Max: 99.3 (06 May 2024 23:03)\par  HR: 81 (07 May 2024 14:00) (71 - 117)\par  BP: 117/73 (07 May 2024 14:00) (97/57 - 117/86)\par  BP(mean): 85 (07 May 2024 14:00) (69 - 94)\par  RR: 15 (07 May 2024 14:00) (12 - 19)\par  SpO2: 98% (07 May 2024 14:00) (96% - 100%)\par  \par  Parameters below as of 07 May 2024 14:00\par  Patient On (Oxygen Delivery Method): room air\par  \par  Parameters below as of 15 Feb 2024 09:27\par  Patient On (Oxygen Delivery Method): room air\par  \par  \par  Daily   \par  Daily \par  \par  GENERAL PHYSICAL EXAM\par  All physical exam findings normal, except for those marked:\par  General:\tab well nourished, not acutely or chronically ill-appearing, postictal\par  HEENT:\tab normocephalic, atraumatic, clear conjunctiva, external ear normal, nasal mucosa normal, oral pharynx clear\par  Neck:          supple, full range of motion, no nuchal rigidity\par  Cardiovascular:\tab regular rate and variability, normal S1, S2, no murmurs\par  Respiratory:\tab CTA B/L\par  Abdominal\tab :                    soft, ND, NT, bowel sounds present, no masses, no organomegaly\par  Extremities:\tab no joint swelling, erythema, tenderness; normal ROM, no contractures\par  Skin:\tab\tab no rash\par  \par  NEUROLOGIC EXAM\par  Mental Status:     Oriented to time/place/person; Good eye contact ; follow simple commands ;  Age appropriate language  and fund of  knowledge.\par  Cranial Nerves:   PERRL, EOMI, no facial asymmetry , V1-V3 intact , symmetric palate, tongue midline. \par  Eyes:\tab\tab\tab Normal: optic discs \par  Visual Fields:\tab\tab Full visual field\par  Muscle Strength:\tab  Full strength 5/5, proximal and distal,  upper and lower extremities\par  Muscle Tone:\tab Normal tone\par  Deep Tendon Reflexes:         2+/4  : Biceps, Brachioradialis, Triceps Bilateral;  2+/4 : Pattelar, Ankle bilateral. No clonus.\par  Plantar Response:\tab Plantar reflexes flexion bilaterally\par  Sensation:\tab\tab Intact to pain, light touch, temperature and vibration throughout.\par  Coordination/\tab No dysmetria in finger to nose test bilaterally\par  Cerebellum\tab\par  Tandem Gait/Romberg\tab deferred\par  \par  \par  Lab Results:\par  \par  \par  \par  \par  \par  \par  \par  \par  \par  EEG Results:\par  \ql\plain\f0\fs24{\*\bkmkstart [id number]}{\*\bkmkend [id number]}\plain\f1\fs16\smjv1339\hich\f1\dbch\f1\loch\f1\cf2\fs16\b\ul EEG REPORT:\plain\f1\fs16\uhvo6838\hich\f1\dbch\f1\loch\f1\cf2\fs16  \par  \plain\f1\fs16\zttp3584\hich\f1\dbch\f1\loch\f1\cf2\fs16\b\ul{\field{\*\fldinst HYPERLINK [id number] }{\fldrslt EEG Report:}}\plain\f1\fs16\xudy4370\hich\f1\dbch\f1\loch\f1\cf2\fs16\ql\par  \trowd\yacyuz16\lastrow\ywtpzvg25\trpaddfl3\qrlalqg03\trpaddfr3\trpaddt0\trpaddft3\trpaddb0\trpaddfb3\trleft0  \clvertalt\ujqaty76\clpadft3\xgvgsf36\clpadfr3\clpadl0\clpadfl3\clpadb0\clpadfb3\pmadr2858  \clvertalt\zwrlfv78\clpadft3\luvytv88\clpadfr3\clpadl0\clpadfl3\clpadb0\clpadfb3\wjphe6131  \pard\intbl\ssparaaux0\s0\fi-120\li120\ql\plain\f0\fs24{\*\bkmkstart [id number]}{\*\bkmkend [id number]}\plain\f1\fs16\jlkk8668\hich\f1\dbch\f1\loch\f1\cf2\fs16 \'b7 \plain\f1\fs16\geui2369\hich\f1\dbch\f1\loch\f1\cf2\fs16\b EEG Report\plain\f1\fs16\glsu0930\hich\f1\dbch\f1\loch\f1\cf2\fs16\cell  \pard\intbl\ssparaaux0\s0\ql\plain\f0\fs24\plain\f1\fs16\jgmo8244\hich\f1\dbch\f1\loch\f1\cf2\fs16\cell  \intbl\row  \pard\ssparaaux0\s0\ql\plain\f0\fs24\plain\f1\fs16\kecf2394\hich\f1\dbch\f1\loch\f1\cf2\fs16 EEG REPORT: \par  [facility]\par  [facility]\par  Report of Intracranial Video EEG\par  \par  History:\par  This is a 19 year old right handed female with a history of treatment-resistant focal onset seizures and a 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 \par  \par  Medication: \par  ESL 800mg qHS\par  \par  Home Regimen: ESL 1600mg qHS\par  \par  Recording Technique:\par  The patient underwent continuous Video-EEG monitoring, using Telemetry System hardware on the [employer]LT[employer] Digital System.  EEG and video data were stored on a computer hard drive with important events saved in digital archive files. The material was reviewed   by a physician (electroencephalographer / epileptologist) on a daily basis.  Cole and seizure detection algorithms were utilized and reviewed.  An EEG Technician attended to the patient for 8 to 10 hours per day. The patient was observed by the epilepsy   nursing staff 24-hours per day. The epilepsy center neurologist was available in person or on call 24-hours per day.\par  \par  Placement and Labeling of Electrodes:\par  1. Pars Marginalis Anterior (PMA)- 10\par  2. Middle Temporal Amygdala (MTA)- 12\par  3. Middle Temporal Hippocampal Head (MTHH)- 12\par  4. Middle Temporal Hippocampal Body (MTHB)- 12\par  5. Posterior Temporal (PT)- 10\par  6. Anterior Parietal (APar)- 14\par  7. Middle Frontal Anterior Cingulate (MFAC)- 12\par  8. Middle Frontal Middle Cingulate (MFMC)- 12\par  9. Middle Frontal Posterior Cingulate (MFPC)- 14\par  10. Pars Marginalis Posterior (PMPo)- 8\par  11. Pars Marginalis Superior (PMSu)- 8\par  12. Pars Marginalis Inferior (PMIn)- 10\par  13. Superior Insula (SI)- 8\par  14. Inferior Insula (II)- 10\par  EKG 2\par  Total Leads: 154\par  \par  The EEG was performed utilizing intracranial electrodes. Recording was at a sampling rate of 500-1000 samples per second per channel.  Time synchronized digital video recording was done simultaneously with EEG recording.  A low light infrared camera was   used for low light recording.  Cole and seizure detection algorithms were utilized and additionally reviewed.\par  \par  The montage for the intracranial electrodes was as the following:\par  Page 1: ALL\par  Page 2: Temporal 2, 3, 4, 5\par  Page 3: Cinguloparietal 6, 7, 8, 9\par  Page 4: Pars Marginalis 1, 10, 11, 12\par  Page 5: Insula 13, 14\par  \par  Date & time: \par  Start: 05-06-24 14:02\par  End: 05-07-24 08:00\par  \par  TECHNICAL LIMITATIONS: \par  MTHH1 \par  \par  FINDINGS: The background consisted of mostly mixed alpha, beta, theta frequencies of sinusoidal appearance.  \par  Interictal abnormalities: Abundant 2.5-3Hz periodic spike polyspike discharges over the medial contacts of the pars marginalis (PMA2-5, PMSu2-4, PMPo3-5, PMIn 2-4). \par  Ictal abnormalities: No seizures or push button events recorded.\par  \par  EEG Summary/Classification:\par  Abnormal/Normal intracranial EEG due to :\par         Interictal abnormalities: Abundant 2.5-3Hz periodic spike polyspike discharges over the medial contacts of the pars marginalis (PMA2-5, PMSu2-4, PMPo3-5, PMIn 2-4). \par         Ictal abnormalities: None\par  \par  Impression/Clinical Correlate: The findings of this stereotactic EEG indicate cortical irritability in the medial contacts of the pars marginalis which, in conjunction with known underlying focal cortical dysplasia within this region and a typical known   interictal pattern of focal cortical dysplasias, is sensitive for determining the underlying epileptogenic zone (May-Giselle et al 2018 - [email or web address])  No seizures were recorded during this study. \par  \par  ***THIS IS A PRELIMINARY FELLOW REPORT PENDING REVIEW WITH ATTENDING EPILEPTOLOGIST***\par  \par  Salinas Kelly MD\par  PGY-6, Pediatric Epilepsy Fellow \par  \par  \plain\f1\fs16\wdtz9614\hich\f1\dbch\f1\loch\f1\cf2\fs16\strike\plain\f1\fs16\tzrb8043\hich\f1\dbch\f1\loch\f1\cf2\fs16\plain\f1\fs16\wkvc8764\hich\f1\dbch\f1\loch\f1\cf2\fs16\par  \par  {\*\bkmkstart bkClinDocSignatures}{\*\bkmkend bkClinDocSignatures}{\*\bkmkstart bkcommentSBK}{\*\bkmkend bkcommentSBK}\plain\f1\fs16\ipev7728\hich\f1\dbch\f1\loch\f1\cf2\fs16\b Electronic Signatures:\plain\f1\fs16\rvar9970\hich\f1\dbch\f1\loch\f1\cf2\fs16\par  \plain\f1\fs16\vohg6985\hich\f1\dbch\f1\loch\f1\cf2\fs16\b\ul Pavkovic, Jerry M (MD)\plain\f1\fs16\vtqm2487\hich\f1\dbch\f1\loch\f1\cf2\fs16   \plain\f1\fs18\rzli4527\hich\f1\dbch\f1\loch\f1\cf2\fs18 (Signature Pending)\par  \fi-360\li720\plain\f0\fs24\plain\f1\fs18\jfnc1040\hich\f1\dbch\f1\loch\f1\cf2\fs18\tab\plain\f1\fs16\uqlp4665\hich\f1\dbch\f1\loch\f1\cf2\fs16\b\i Co-Signer: \plain\f1\fs16\kpqs2608\hich\f1\dbch\f1\loch\f1\cf2\fs16\i EEG REPORT\plain\f1\fs16\nxcf1633\hich\f1\dbch\f1\loch\f1\cf2\fs16\par  \fi0\li0\plain\f0\fs24\plain\f1\fs16\uysu9048\hich\f1\dbch\f1\loch\f1\cf2\fs16\b\ul Leticia, Salinas T (MD)\plain\f1\fs16\wmom9408\hich\f1\dbch\f1\loch\f1\cf2\fs16   \plain\f1\fs18\yjfe9004\hich\f1\dbch\f1\loch\f1\cf2\fs18 (Signed 07-May-2024 14:31)\par  \fi-360\li720\plain\f0\fs24\plain\f1\fs18\cphe1892\hich\f1\dbch\f1\loch\f1\cf2\fs18\tab\plain\f1\fs16\cwlo9090\hich\f1\dbch\f1\loch\f1\cf2\fs16\b\i Authored: \plain\f1\fs16\qpkb4874\hich\f1\dbch\f1\loch\f1\cf2\fs16\i EEG REPORT\plain\f1\fs16\osbc9190\hich\f1\dbch\f1\loch\f1\cf2\fs16\par  \fi0\li0\plain\f0\fs24\plain\f1\fs16\prmv8053\hich\f1\dbch\f1\loch\f1\cf2\fs16\par  \par  \plain\f1\fs16\scdf8599\hich\f1\dbch\f1\loch\f1\cf2\fs16\b\i Last Updated: \plain\f1\fs16\exoa7292\hich\f1\dbch\f1\loch\f1\cf2\fs16\i 07-May-2024 14:31 by Salinas Kelly (MD)\plain\f0\fs20\fpim2221\hich\f0\dbch\f0\loch\f0\fs20\par  \pard\plain\f0\fs24\plain\f0\fs20\vvkz3885\hich\f0\dbch\f0\loch\f0\fs20\par  \par  \par  Imaging Studies:\par  }

## 2024-05-07 NOTE — EEG REPORT - NS EEG TEXT BOX
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     Medication:   ESL 800mg qHS    Home Regimen: ESL 1600mg qHS    Recording Technique:  The patient underwent continuous Video-EEG monitoring, using Telemetry System hardware on the XLTek Digital System.  EEG and video data were stored on a computer hard drive with important events saved in digital archive files. The material was reviewed by a physician (electroencephalographer / epileptologist) on a daily basis.  Cole and seizure detection algorithms were utilized and reviewed.  An EEG Technician attended to the patient for 8 to 10 hours per day. The patient was observed by the epilepsy nursing staff 24-hours per day. The epilepsy center neurologist was available in person or on call 24-hours per day.    Placement and Labeling of Electrodes:  1. Pars Marginalis Anterior (PMA)- 10  2. Middle Temporal Amygdala (MTA)- 12  3. Middle Temporal Hippocampal Head (MTHH)- 12  4. Middle Temporal Hippocampal Body (MTHB)- 12  5. Posterior Temporal (PT)- 10  6. Anterior Parietal (APar)- 14  7. Middle Frontal Anterior Cingulate (MFAC)- 12  8. Middle Frontal Middle Cingulate (MFMC)- 12  9. Middle Frontal Posterior Cingulate (MFPC)- 14  10. Pars Marginalis Posterior (PMPo)- 8  11. Pars Marginalis Superior (PMSu)- 8  12. Pars Marginalis Inferior (PMIn)- 10  13. Superior Insula (SI)- 8  14. Inferior Insula (II)- 10  EKG 2  Total Leads: 154    The EEG was performed utilizing intracranial electrodes. Recording was at a sampling rate of 500-1000 samples per second per channel.  Time synchronized digital video recording was done simultaneously with EEG recording.  A low light infrared camera was used for low light recording.  Cole and seizure detection algorithms were utilized and additionally reviewed.    The montage for the intracranial electrodes was as the following:  Page 1: ALL  Page 2: Temporal 2, 3, 4, 5  Page 3: Cinguloparietal 6, 7, 8, 9  Page 4: Pars Marginalis 1, 10, 11, 12  Page 5: Insula 13, 14    Date & time:   Start: 05-06-24 14:02  End: 05-07-24 08:00    TECHNICAL LIMITATIONS:   MTHH1     FINDINGS: The background consisted of mostly mixed alpha, beta, theta frequencies of sinusoidal appearance.    Interictal abnormalities: Abundant 2.5-3Hz periodic spike polyspike discharges over the medial contacts of the pars marginalis (PMA2-5, PMSu2-4, PMPo3-5, PMIn 2-4)  Ictal abnormalities: No seizures or push button events recorded.    EEG Summary/Classification:  Abnormal/Normal intracranial EEG due to :         Interictal abnormalities: Abundant 2.5-3Hz periodic spike polyspike discharges over the medial contacts of the pars marginalis (PMA2-5, PMSu2-4, PMPo3-5, PMIn 2-4)         Ictal abnormalities: None    Impression/Clinical Correlate: The findings of this stereotactic EEG indicate cortical irritability in the medial contacts of the pars marginalis which, in conjunction with known underlying focal cortical dysplasia within this region and a typical known interictal pattern of focal cortical dysplasias, is sensitive for determining the underlying epileptogenic zone (see May-Giselle et al 2018 - [email or web address])  No seizures were recorded during this study.     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     Medication:   ESL 800mg qHS    Home Regimen: ESL 1600mg qHS    Recording Technique:  The patient underwent continuous Video-EEG monitoring, using Telemetry System hardware on the XLTek Digital System.  EEG and video data were stored on a computer hard drive with important events saved in digital archive files. The material was reviewed by a physician (electroencephalographer / epileptologist) on a daily basis.  Cole and seizure detection algorithms were utilized and reviewed.  An EEG Technician attended to the patient for 8 to 10 hours per day. The patient was observed by the epilepsy nursing staff 24-hours per day. The epilepsy center neurologist was available in person or on call 24-hours per day.    Placement and Labeling of Electrodes:  1. Pars Marginalis Anterior (PMA)- 10  2. Middle Temporal Amygdala (MTA)- 12  3. Middle Temporal Hippocampal Head (MTHH)- 12  4. Middle Temporal Hippocampal Body (MTHB)- 12  5. Posterior Temporal (PT)- 10  6. Anterior Parietal (APar)- 14  7. Middle Frontal Anterior Cingulate (MFAC)- 12  8. Middle Frontal Middle Cingulate (MFMC)- 12  9. Middle Frontal Posterior Cingulate (MFPC)- 14  10. Pars Marginalis Posterior (PMPo)- 8  11. Pars Marginalis Superior (PMSu)- 8  12. Pars Marginalis Inferior (PMIn)- 10  13. Superior Insula (SI)- 8  14. Inferior Insula (II)- 10  EKG 2  Total Leads: 154    The EEG was performed utilizing intracranial electrodes. Recording was at a sampling rate of 500-1000 samples per second per channel.  Time synchronized digital video recording was done simultaneously with EEG recording.  A low light infrared camera was used for low light recording.  Cole and seizure detection algorithms were utilized and additionally reviewed.    The montage for the intracranial electrodes was as the following:  Page 1: ALL  Page 2: Temporal 2, 3, 4, 5  Page 3: Cinguloparietal 6, 7, 8, 9  Page 4: Pars Marginalis 1, 10, 11, 12  Page 5: Insula 13, 14    Date & time:   Start: 05-06-24 14:02  End: 05-07-24 08:00    TECHNICAL LIMITATIONS:   MTHH1     FINDINGS: The background consisted of mostly mixed alpha, beta, theta frequencies of sinusoidal appearance.    Interictal abnormalities: Abundant 2.5-3Hz periodic spike polyspike discharges over the medial contacts of the pars marginalis (PMA2-5, PMSu2-4, PMPo3-5, PMIn 2-4) and anterior parietal regions (APar1-4).   Ictal abnormalities: No seizures or push button events recorded.    EEG Summary/Classification:  Abnormal/Normal intracranial EEG due to :         Interictal abnormalities: Abundant 2.5-3Hz periodic spike polyspike discharges over the medial contacts of the pars marginalis (PMA2-5, PMSu2-4, PMPo3-5, PMIn 2-4) and anterior parietal regions (APar1-4).          Ictal abnormalities: None    Impression/Clinical Correlate: The findings of this stereotactic EEG indicate cortical irritability in the medial contacts of the pars marginalis and anterior parietal regions which, in conjunction with known underlying focal cortical dysplasia within this region and a typical known interictal pattern of focal cortical dysplasias, is sensitive for determining the underlying epileptogenic zone (Alexis et al 2018 - [email or web address])  No seizures were recorded during this study.     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     Medication:   ESL 800mg qHS    Home Regimen: ESL 1600mg qHS    Recording Technique:  The patient underwent continuous Video-EEG monitoring, using Telemetry System hardware on the XLTek Digital System.  EEG and video data were stored on a computer hard drive with important events saved in digital archive files. The material was reviewed by a physician (electroencephalographer / epileptologist) on a daily basis.  Cole and seizure detection algorithms were utilized and reviewed.  An EEG Technician attended to the patient for 8 to 10 hours per day. The patient was observed by the epilepsy nursing staff 24-hours per day. The epilepsy center neurologist was available in person or on call 24-hours per day.    Placement and Labeling of Electrodes:  1. Pars Marginalis Anterior (PMA)- 10  2. Middle Temporal Amygdala (MTA)- 12  3. Middle Temporal Hippocampal Head (MTHH)- 12  4. Middle Temporal Hippocampal Body (MTHB)- 12  5. Posterior Temporal (PT)- 10  6. Anterior Parietal (APar)- 14  7. Middle Frontal Anterior Cingulate (MFAC)- 12  8. Middle Frontal Middle Cingulate (MFMC)- 12  9. Middle Frontal Posterior Cingulate (MFPC)- 14  10. Pars Marginalis Posterior (PMPo)- 8  11. Pars Marginalis Superior (PMSu)- 8  12. Pars Marginalis Inferior (PMIn)- 10  13. Superior Insula (SI)- 8  14. Inferior Insula (II)- 10  EKG 2  Total Leads: 154    The EEG was performed utilizing intracranial electrodes. Recording was at a sampling rate of 500-1000 samples per second per channel.  Time synchronized digital video recording was done simultaneously with EEG recording.  A low light infrared camera was used for low light recording.  Cole and seizure detection algorithms were utilized and additionally reviewed.    The montage for the intracranial electrodes was as the following:  Page 1: ALL  Page 2: Temporal 2, 3, 4, 5  Page 3: Cinguloparietal 6, 7, 8, 9  Page 4: Pars Marginalis 1, 10, 11, 12  Page 5: Insula 13, 14    Date & time:   Start: 05-06-24 14:02  End: 05-07-24 08:00    TECHNICAL LIMITATIONS:   MTHH1     FINDINGS: The background consisted of mostly mixed alpha, beta, theta frequencies of sinusoidal appearance.    Interictal abnormalities: Abundant 2.5-3Hz periodic spike polyspike discharges over the medial contacts of the pars marginalis (PMA2-5, PMSu2-4, PMPo3-5, PMIn 2-4).   Ictal abnormalities: No seizures or push button events recorded.    EEG Summary/Classification:  Abnormal/Normal intracranial EEG due to :         Interictal abnormalities: Abundant 2.5-3Hz periodic spike polyspike discharges over the medial contacts of the pars marginalis (PMA2-5, PMSu2-4, PMPo3-5, PMIn 2-4).          Ictal abnormalities: None    Impression/Clinical Correlate: The findings of this stereotactic EEG indicate cortical irritability in the medial contacts of the pars marginalis which, in conjunction with known underlying focal cortical dysplasia within this region and a typical known interictal pattern of focal cortical dysplasias, is sensitive for determining the underlying epileptogenic zone (Alexis et al 2018 - [email or web address])  No seizures were recorded during this study.     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     Medication:   ESL 800mg qHS    Home Regimen: ESL 1600mg qHS    Recording Technique:  The patient underwent continuous Video-EEG monitoring, using Telemetry System hardware on the XLTek Digital System.  EEG and video data were stored on a computer hard drive with important events saved in digital archive files. The material was reviewed by a physician (electroencephalographer / epileptologist) on a daily basis.  Cole and seizure detection algorithms were utilized and reviewed.  An EEG Technician attended to the patient for 8 to 10 hours per day. The patient was observed by the epilepsy nursing staff 24-hours per day. The epilepsy center neurologist was available in person or on call 24-hours per day.    Placement and Labeling of Electrodes:  1. Pars Marginalis Anterior (PMA)- 10  2. Middle Temporal Amygdala (MTA)- 12  3. Middle Temporal Hippocampal Head (MTHH)- 12  4. Middle Temporal Hippocampal Body (MTHB)- 12  5. Posterior Temporal (PT)- 10  6. Anterior Parietal (APar)- 14  7. Middle Frontal Anterior Cingulate (MFAC)- 12  8. Middle Frontal Middle Cingulate (MFMC)- 12  9. Middle Frontal Posterior Cingulate (MFPC)- 14  10. Pars Marginalis Posterior (PMPo)- 8  11. Pars Marginalis Superior (PMSu)- 8  12. Pars Marginalis Inferior (PMIn)- 10  13. Superior Insula (SI)- 8  14. Inferior Insula (II)- 10  EKG 2  Total Leads: 154    The EEG was performed utilizing intracranial electrodes. Recording was at a sampling rate of 500-1000 samples per second per channel.  Time synchronized digital video recording was done simultaneously with EEG recording.  A low light infrared camera was used for low light recording.  Cole and seizure detection algorithms were utilized and additionally reviewed.    The montage for the intracranial electrodes was as the following:  Page 1: ALL  Page 2: Temporal 2, 3, 4, 5  Page 3: Cinguloparietal 6, 7, 8, 9  Page 4: Pars Marginalis 1, 10, 11, 12  Page 5: Insula 13, 14    Date & time:   Start: 05-06-24 14:02  End: 05-07-24 08:00    TECHNICAL LIMITATIONS:   MTHH1     FINDINGS: The background consisted of mostly mixed alpha, beta, theta frequencies of sinusoidal appearance.    Interictal abnormalities: Abundant 2.5-3Hz periodic spike polyspike discharges over the medial contacts of the pars marginalis (PMA2-5, PMSu2-4, PMPo3-5, PMIn 2-4).   Ictal abnormalities: No seizures or push button events recorded.    EEG Summary/Classification:  Abnormal/Normal intracranial EEG due to :         Interictal abnormalities: Abundant 2.5-3Hz periodic spike polyspike discharges over the medial contacts of the pars marginalis (PMA2-5, PMSu2-4, PMPo3-5, PMIn 2-4).          Ictal abnormalities: None    Impression/Clinical Correlate: The findings of this stereotactic EEG indicate cortical irritability in the medial contacts of the pars marginalis which, in conjunction with known underlying focal cortical dysplasia within this region and a typical known interictal pattern of focal cortical dysplasias, is sensitive for determining the underlying epileptogenic zone (May-Giselle et al 2018 - [email or web address])  No seizures were recorded during this study.     Salinas Kelly MD  PGY-6, Pediatric Epilepsy Fellow     Jerry West MD  Attending Physician   Pediatric Neurology/Epilepsy

## 2024-05-07 NOTE — PROGRESS NOTE PEDS - ASSESSMENT
19 year old F with epilepsy Admitted for elective SEEG placement        5/6 OR for SEEG placement  5/7 Off seizure meds

## 2024-05-07 NOTE — PROGRESS NOTE PEDS - ASSESSMENT
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 Patient is currently managed on eslicabazepine 1600mg daily with seizures occurring daily. Patient is clinically stable with a nonfocal neurologic examination. Patient with left-sided signal abnormality in the pars marginalis and focal hypometabolism signal aberration in the left pars marginalis on PET MRI. Patient has had many typical seizures that have been captured on her phase I, with possible lateralization to the L posterior quadrant. ASM weaned as of this morning; awaiting event capture to better localize.     Plan:    Focal seizures:  - holding home ASM for localization   - Ativan 4mg PRN for seizures lasting longer than 5 minutes, please alert neurology fellow prior to giving  - seizure precautions  - 1:1 supervision during admission  - cont. pulse ox  - cardiac bedside monitor    FENGI:   - regular diet    Patient discussed with Dr. West, Pediatric Neurology Attending

## 2024-05-07 NOTE — PROGRESS NOTE PEDS - ASSESSMENT
19yoF with focal epilepsy admitted after JAMILA guided stereotactic EEG placement, no seizures of yet.    AED medication wean per neurology  stereotactic EEG  Regular diet  Tentatively on the OR schedule for Friday for lead removal

## 2024-05-08 PROCEDURE — 99232 SBSQ HOSP IP/OBS MODERATE 35: CPT | Mod: 1L

## 2024-05-08 PROCEDURE — 95720 EEG PHY/QHP EA INCR W/VEEG: CPT | Mod: 1L

## 2024-05-08 PROCEDURE — 99291 CRITICAL CARE FIRST HOUR: CPT

## 2024-05-08 RX ORDER — OXYCODONE HYDROCHLORIDE 5 MG/1
5 TABLET ORAL ONCE
Refills: 0 | Status: DISCONTINUED | OUTPATIENT
Start: 2024-05-08 | End: 2024-05-10

## 2024-05-08 RX ORDER — ONDANSETRON 8 MG/1
4 TABLET, FILM COATED ORAL ONCE
Refills: 0 | Status: COMPLETED | OUTPATIENT
Start: 2024-05-08 | End: 2024-05-08

## 2024-05-08 RX ORDER — IBUPROFEN 200 MG
400 TABLET ORAL EVERY 6 HOURS
Refills: 0 | Status: DISCONTINUED | OUTPATIENT
Start: 2024-05-08 | End: 2024-05-08

## 2024-05-08 RX ADMIN — SODIUM CHLORIDE 3 MILLILITER(S): 9 INJECTION INTRAMUSCULAR; INTRAVENOUS; SUBCUTANEOUS at 06:06

## 2024-05-08 RX ADMIN — Medication 650 MILLIGRAM(S): at 09:00

## 2024-05-08 RX ADMIN — Medication 650 MILLIGRAM(S): at 08:37

## 2024-05-08 RX ADMIN — SODIUM CHLORIDE 3 MILLILITER(S): 9 INJECTION INTRAMUSCULAR; INTRAVENOUS; SUBCUTANEOUS at 00:23

## 2024-05-08 RX ADMIN — SODIUM CHLORIDE 3 MILLILITER(S): 9 INJECTION INTRAMUSCULAR; INTRAVENOUS; SUBCUTANEOUS at 18:00

## 2024-05-08 RX ADMIN — Medication 650 MILLIGRAM(S): at 02:48

## 2024-05-08 RX ADMIN — Medication 650 MILLIGRAM(S): at 16:17

## 2024-05-08 RX ADMIN — Medication 650 MILLIGRAM(S): at 16:57

## 2024-05-08 RX ADMIN — Medication 650 MILLIGRAM(S): at 03:16

## 2024-05-08 RX ADMIN — ONDANSETRON 8 MILLIGRAM(S): 8 TABLET, FILM COATED ORAL at 10:07

## 2024-05-08 RX ADMIN — SODIUM CHLORIDE 3 MILLILITER(S): 9 INJECTION INTRAMUSCULAR; INTRAVENOUS; SUBCUTANEOUS at 13:09

## 2024-05-08 RX ADMIN — Medication 650 MILLIGRAM(S): at 22:57

## 2024-05-08 NOTE — PATIENT PROFILE PEDIATRIC - BLOOD AVOIDANCE/RESTRICTIONS, PROFILE
For Medical Surgical Hx obtained at Admission, Please see Provider H&P
The patient is a 62y Male complaining of nasal congestion.
none

## 2024-05-08 NOTE — EEG REPORT - NS EEG TEXT BOX
EEG REPORT:   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     Medication:   None    Home Regimen: ESL 1600mg qHS    Recording Technique:  The patient underwent continuous Video-EEG monitoring, using Telemetry System hardware on the XLTek Digital System.  EEG and video data were stored on a computer hard drive with important events saved in digital archive files. The material was reviewed by a physician (electroencephalographer / epileptologist) on a daily basis.  Cole and seizure detection algorithms were utilized and reviewed.  An EEG Technician attended to the patient for 8 to 10 hours per day. The patient was observed by the epilepsy nursing staff 24-hours per day. The epilepsy center neurologist was available in person or on call 24-hours per day.    Placement and Labeling of Electrodes:  1. Pars Marginalis Anterior (PMA)- 10  2. Middle Temporal Amygdala (MTA)- 12  3. Middle Temporal Hippocampal Head (MTHH)- 12  4. Middle Temporal Hippocampal Body (MTHB)- 12  5. Posterior Temporal (PT)- 10  6. Anterior Parietal (APar)- 14  7. Middle Frontal Anterior Cingulate (MFAC)- 12  8. Middle Frontal Middle Cingulate (MFMC)- 12  9. Middle Frontal Posterior Cingulate (MFPC)- 14  10. Pars Marginalis Posterior (PMPo)- 8  11. Pars Marginalis Superior (PMSu)- 8  12. Pars Marginalis Inferior (PMIn)- 10  13. Superior Insula (SI)- 8  14. Inferior Insula (II)- 10  EKG 2  Total Leads: 154    The EEG was performed utilizing intracranial electrodes. Recording was at a sampling rate of 500-1000 samples per second per channel.  Time synchronized digital video recording was done simultaneously with EEG recording.  A low light infrared camera was used for low light recording.  Cole and seizure detection algorithms were utilized and additionally reviewed.    The montage for the intracranial electrodes was as the following:  Page 1: ALL  Page 2: Temporal 2, 3, 4, 5  Page 3: Cinguloparietal 6, 7, 8, 9  Page 4: Pars Marginalis 1, 10, 11, 12  Page 5: Insula 13, 14    Date & time:   Start: 05-07-24 08:00  End: 05-08-24 08:00    TECHNICAL LIMITATIONS:   MTHH1     FINDINGS: The background consisted of mostly mixed alpha, beta, theta frequencies of sinusoidal appearance.    Interictal abnormalities: Abundant 2.5-3Hz periodic spike polyspike discharges over the medial contacts of the pars marginalis (PMA2-5, PMSu2-4, PMPo3-5, PMIn 2-4).   Ictal abnormalities: No seizures or push button events recorded.    EEG Summary/Classification:  Abnormal/Normal intracranial EEG due to :         Interictal abnormalities: Abundant 2.5-3Hz periodic spike polyspike discharges over the medial contacts of the pars marginalis (PMA2-5, PMSu2-4, PMPo3-5, PMIn 2-4).          Ictal abnormalities: None    Impression/Clinical Correlate: The findings of this stereotactic EEG indicate cortical irritability in the medial contacts of the pars marginalis. No seizures were recorded during this study.     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     Medication:   None    Home Regimen: ESL 1600mg qHS    Recording Technique:  The patient underwent continuous Video-EEG monitoring, using Telemetry System hardware on the XLTek Digital System.  EEG and video data were stored on a computer hard drive with important events saved in digital archive files. The material was reviewed by a physician (electroencephalographer / epileptologist) on a daily basis.  Cole and seizure detection algorithms were utilized and reviewed.  An EEG Technician attended to the patient for 8 to 10 hours per day. The patient was observed by the epilepsy nursing staff 24-hours per day. The epilepsy center neurologist was available in person or on call 24-hours per day.    Placement and Labeling of Electrodes:  1. Pars Marginalis Anterior (PMA)- 10  2. Middle Temporal Amygdala (MTA)- 12  3. Middle Temporal Hippocampal Head (MTHH)- 12  4. Middle Temporal Hippocampal Body (MTHB)- 12  5. Posterior Temporal (PT)- 10  6. Anterior Parietal (APar)- 14  7. Middle Frontal Anterior Cingulate (MFAC)- 12  8. Middle Frontal Middle Cingulate (MFMC)- 12  9. Middle Frontal Posterior Cingulate (MFPC)- 14  10. Pars Marginalis Posterior (PMPo)- 8  11. Pars Marginalis Superior (PMSu)- 8  12. Pars Marginalis Inferior (PMIn)- 10  13. Superior Insula (SI)- 8  14. Inferior Insula (II)- 10  EKG 2  Total Leads: 154    The EEG was performed utilizing intracranial electrodes. Recording was at a sampling rate of 500-1000 samples per second per channel.  Time synchronized digital video recording was done simultaneously with EEG recording.  A low light infrared camera was used for low light recording.  Cole and seizure detection algorithms were utilized and additionally reviewed.    The montage for the intracranial electrodes was as the following:  Page 1: ALL  Page 2: Temporal 2, 3, 4, 5  Page 3: Cinguloparietal 6, 7, 8, 9  Page 4: Pars Marginalis 1, 10, 11, 12  Page 5: Insula 13, 14    Date & time:   Start: 05-07-24 08:00  End: 05-08-24 08:00    TECHNICAL LIMITATIONS:   MTHH1     FINDINGS: The background consisted of mostly mixed alpha, beta, theta frequencies of sinusoidal appearance.    Interictal abnormalities: Abundant 2.5-3Hz periodic spike polyspike discharges over the medial contacts of the pars marginalis (PMA2-5, PMSu2-4, PMPo3-5, PMIn 2-4).   Ictal abnormalities: No seizures or push button events recorded.    EEG Summary/Classification:  Abnormal/Normal intracranial EEG due to :         Interictal abnormalities: Abundant 2.5-3Hz periodic spike polyspike discharges over the medial contacts of the pars marginalis (PMA2-5, PMSu2-4, PMPo3-5, PMIn 2-4).          Ictal abnormalities: None    Impression/Clinical Correlate: The findings of this stereotactic EEG indicate cortical irritability in the medial contacts of the pars marginalis. No seizures were recorded during this study.     Salinas Kelly MD  PGY-6, Pediatric Epilepsy Fellow     Jerry West MD  Attending Physician   Pediatric Neurology/Epilepsy

## 2024-05-08 NOTE — PROGRESS NOTE PEDS - ASSESSMENT
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 Patient is currently managed on eslicabazepine 1600mg and cenobamate 25mg qD w/ daily with seizures. Patient is clinically stable with a nonfocal neurologic examination. Patient with left-sided signal abnormality in the pars marginalis and focal hypometabolism signal aberration in the left pars marginalis on PET MRI. Patient has had many typical seizures that have been captured on her phase I, with possible lateralization to the L posterior quadrant. ASM weaned off as of yesterday; awaiting event capture to better localize.     Plan:    Focal seizures:  - holding home ASM for localization (eslicarbazepine 1600mg qD, cenobamate 25mg qD)  - Ativan 4mg PRN for seizures lasting longer than 5 minutes, please alert neurology fellow prior to giving  - seizure precautions  - 1:1 supervision during admission  - cont. pulse ox  - cardiac bedside monitor    FENGI:   - regular diet    Patient discussed with Dr. West, Pediatric Neurology Attending

## 2024-05-08 NOTE — PROGRESS NOTE PEDS - SUBJECTIVE AND OBJECTIVE BOX
PAST 24hr EVENTS: Patient seen and examined with mother at bedside. No new complaints overnight. No seizure activity as of yet.     Vital Signs Last 24 Hrs  T(C): 36.9 (08 May 2024 05:00), Max: 37.4 (07 May 2024 22:57)  T(F): 98.4 (08 May 2024 05:00), Max: 99.3 (07 May 2024 22:57)  HR: 93 (08 May 2024 05:00) (71 - 100)  BP: 110/76 (08 May 2024 05:00) (97/51 - 117/73)  BP(mean): 86 (08 May 2024 05:00) (66 - 88)  RR: 14 (08 May 2024 05:00) (12 - 18)  SpO2: 100% (08 May 2024 05:00) (98% - 100%)    Parameters below as of 08 May 2024 05:00  Patient On (Oxygen Delivery Method): room air      I&O's Summary    06 May 2024 07:01  -  07 May 2024 07:00  --------------------------------------------------------  IN: 666.5 mL / OUT: 2150 mL / NET: -1483.5 mL    07 May 2024 07:01  -  08 May 2024 06:41  --------------------------------------------------------  IN: 0 mL / OUT: 2800 mL / NET: -2800 mL    MEDICATIONS  (STANDING):  sodium chloride 0.9% lock flush - Peds 3 milliLiter(s) IV Push every 6 hours    MEDICATIONS  (PRN):  acetaminophen   Oral Tab/Cap - Peds. 650 milliGRAM(s) Oral every 6 hours PRN Temp greater or equal to 38 C (100.4 F), Mild Pain (1 - 3), Moderate Pain (4 - 6), Severe Pain (7 - 10)  LORazepam IV Push - Peds 4 milliGRAM(s) IV Push once PRN seizure  oxyCODONE   IR Oral Tab/Cap - Peds 5 milliGRAM(s) Oral once PRN Severe Pain (7 - 10)  senna Oral Liquid - Peds 10 milliLiter(s) Oral at bedtime PRN Constipation      PHYSICAL EXAM:   AOx3, appropriate, follows commands  PERRL, EOMI, face symmetrical   BONDS x 4 with good strength   Sensation intact to light touch   No pronator drift   head wrap in place

## 2024-05-08 NOTE — DIETITIAN INITIAL EVALUATION PEDIATRIC - ENERGY NEEDS
Height 5/6: 159.9 cm  Weight 5/6: 45.5 kg Height 5/6: 159.9 cm  Weight 5/6: 45.5 kg  BMI for age: 17.8 (underweight)

## 2024-05-08 NOTE — DIETITIAN INITIAL EVALUATION PEDIATRIC - NS AS NUTRI INTERV MEALS SNACK
1. Continue regular PO diet as tolerated; obtain food preferences 2. Monitor PO intake, weights, labs/General/healthful diet

## 2024-05-08 NOTE — DIETITIAN INITIAL EVALUATION PEDIATRIC - PERTINENT PMH/PSH
MEDICATIONS  (STANDING):  sodium chloride 0.9% lock flush - Peds 3 milliLiter(s) IV Push every 6 hours    MEDICATIONS  (PRN):  acetaminophen   Oral Tab/Cap - Peds. 650 milliGRAM(s) Oral every 6 hours PRN Temp greater or equal to 38 C (100.4 F), Mild Pain (1 - 3), Moderate Pain (4 - 6), Severe Pain (7 - 10)  LORazepam IV Push - Peds 4 milliGRAM(s) IV Push once PRN seizure  oxyCODONE   IR Oral Tab/Cap - Peds 5 milliGRAM(s) Oral once PRN Severe Pain (7 - 10)  senna Oral Liquid - Peds 10 milliLiter(s) Oral at bedtime PRN Constipation

## 2024-05-08 NOTE — DIETITIAN INITIAL EVALUATION PEDIATRIC - OTHER INFO
19y F pt with focal epilepsy admitted after JAMILA guided stereotactic EEG placement, no seizures of yet; per MD notes.   On room air  On regular PO diet 19y F pt with focal epilepsy admitted after JAMILA guided stereotactic EEG placement, no seizures of yet; per MD notes.   On room air  On regular PO diet  Spoke with Sommer at time of visit, reports she hasn't been eating much here. She had fruit for breakfast. Hasn't had lunch yet. Says she isn't hungry. Denies N/V/GI distress. Says it hurts to chew right now. Said she usually eats well. No diet restrictions, food allergies or intolerances. No food preferences at this time, doesn't want an oral nutrition supplements.  Weights:  2/14: 46.7 kg   5/6: 45.5 kg

## 2024-05-08 NOTE — PATIENT PROFILE PEDIATRIC - AS SC BRADEN SENSORY
(4) no impairment Rhombic Flap Text: The defect edges were debeveled with a #15 scalpel blade.  Given the location of the defect and the proximity to free margins a rhombic flap was deemed most appropriate.  Using a sterile surgical marker, an appropriate rhombic flap was drawn incorporating the defect.    The area thus outlined was incised deep to adipose tissue with a #15 scalpel blade.  The skin margins were undermined to an appropriate distance in all directions utilizing iris scissors.

## 2024-05-08 NOTE — PROGRESS NOTE PEDS - ASSESSMENT
19yoF with focal epilepsy admitted after JAMILA guided stereotactic EEG placement, no seizures of yet.    AED's currently held, can resume after seizure capture under neurology guidance  stereotactic EEG  Regular diet  Tentatively on the OR schedule for Friday for lead removal

## 2024-05-08 NOTE — PROGRESS NOTE PEDS - SUBJECTIVE AND OBJECTIVE BOX
Interval/Overnight Events: no seizures  one headache, received Tylenol  _________________________________________________________________  Respiratory:  RA  _________________________________________________________________  Cardiac:  Cardiac Rhythm: Sinus rhythm  ________________________________________________________________  Fluids/Electrolytes/Nutrition:  I&O's Summary    07 May 2024 07:01  -  08 May 2024 07:00  --------------------------------------------------------  IN: 0 mL / OUT: 3200 mL / NET: -3200 mL    Diet: Regular  senna Oral Liquid - Peds 10 milliLiter(s) Oral at bedtime PRN  sodium chloride 0.9% lock flush - Peds 3 milliLiter(s) IV Push every 6 hours  _________________________________________________________________  Neurologic: CAPD<9  Adequacy of sedation and pain control has been assessed and adjusted  acetaminophen   Oral Tab/Cap - Peds. 650 milliGRAM(s) Oral every 6 hours PRN  LORazepam IV Push - Peds 4 milliGRAM(s) IV Push once PRN  oxyCODONE   IR Oral Tab/Cap - Peds 5 milliGRAM(s) Oral once PRN  ________________________________________________________________  Access: See plan  Necessity of urinary, arterial, and venous catheters discussed  _________________________________________________________________  PE:  T(C): 36.5 (05-08-24 @ 08:13), Max: 37.4 (05-07-24 @ 22:57)  HR: 87 (05-08-24 @ 08:13) (71 - 100)  BP: 112/73 (05-08-24 @ 08:13) (97/51 - 117/73)  RR: 16 (05-08-24 @ 08:13) (13 - 18)  SpO2: 98% (05-08-24 @ 08:13) (98% - 100%)  General:	No distress  Respiratory:      Effort even and unlabored. Clear bilaterally.   CV:                   Regular rate and rhythm. Normal S1/S2. No murmurs, rubs, or   .                       gallop. Capillary refill < 2 seconds. Distal pulses 2+ and equal.  Abdomen:	Soft, non-distended.  Skin:		No rashes.  Extremities:	Warm and well perfused.   Neurologic:	Alert.  No acute change from baseline exam.  ________________________________________________________________  Patient and Parent/Guardian was updated as to the progress/plan of care.    The patient remains in critical and unstable condition, and requires ICU care and monitoring. Total critical care time spent by attending physician was 35 minutes, excluding procedure time.

## 2024-05-08 NOTE — PROGRESS NOTE PEDS - ASSESSMENT
19 year old F with epilepsy Admitted for elective SEEG placement        5/6 OR for SEEG placement  5/7 Off seizure meds  5/8 off seizure meds, no seizure activity yet

## 2024-05-09 ENCOUNTER — TRANSCRIPTION ENCOUNTER (OUTPATIENT)
Age: 19
End: 2024-05-09

## 2024-05-09 ENCOUNTER — NON-APPOINTMENT (OUTPATIENT)
Age: 19
End: 2024-05-09

## 2024-05-09 PROCEDURE — 95720 EEG PHY/QHP EA INCR W/VEEG: CPT | Mod: 1L

## 2024-05-09 PROCEDURE — 99291 CRITICAL CARE FIRST HOUR: CPT

## 2024-05-09 PROCEDURE — 99231 SBSQ HOSP IP/OBS SF/LOW 25: CPT

## 2024-05-09 RX ORDER — CHLORHEXIDINE GLUCONATE 213 G/1000ML
1 SOLUTION TOPICAL
Refills: 0 | Status: DISCONTINUED | OUTPATIENT
Start: 2024-05-09 | End: 2024-05-10

## 2024-05-09 RX ORDER — ESLICARBAZEPINE ACETATE 800 MG/1
1600 TABLET ORAL AT BEDTIME
Refills: 0 | Status: DISCONTINUED | OUTPATIENT
Start: 2024-05-09 | End: 2024-05-10

## 2024-05-09 RX ORDER — ACETAMINOPHEN 500 MG
650 TABLET ORAL ONCE
Refills: 0 | Status: COMPLETED | OUTPATIENT
Start: 2024-05-09 | End: 2024-05-09

## 2024-05-09 RX ORDER — ONDANSETRON 8 MG/1
4 TABLET, FILM COATED ORAL ONCE
Refills: 0 | Status: COMPLETED | OUTPATIENT
Start: 2024-05-09 | End: 2024-05-09

## 2024-05-09 RX ORDER — LACOSAMIDE 50 MG/1
200 TABLET ORAL ONCE
Refills: 0 | Status: DISCONTINUED | OUTPATIENT
Start: 2024-05-09 | End: 2024-05-09

## 2024-05-09 RX ORDER — DEXTROSE MONOHYDRATE, SODIUM CHLORIDE, AND POTASSIUM CHLORIDE 50; .745; 4.5 G/1000ML; G/1000ML; G/1000ML
1000 INJECTION, SOLUTION INTRAVENOUS
Refills: 0 | Status: DISCONTINUED | OUTPATIENT
Start: 2024-05-09 | End: 2024-05-10

## 2024-05-09 RX ORDER — ACETAMINOPHEN 500 MG
675 TABLET ORAL ONCE
Refills: 0 | Status: DISCONTINUED | OUTPATIENT
Start: 2024-05-10 | End: 2024-05-10

## 2024-05-09 RX ADMIN — LACOSAMIDE 40 MILLIGRAM(S): 50 TABLET ORAL at 07:59

## 2024-05-09 RX ADMIN — SODIUM CHLORIDE 3 MILLILITER(S): 9 INJECTION INTRAMUSCULAR; INTRAVENOUS; SUBCUTANEOUS at 18:36

## 2024-05-09 RX ADMIN — Medication 650 MILLIGRAM(S): at 12:00

## 2024-05-09 RX ADMIN — Medication 650 MILLIGRAM(S): at 11:55

## 2024-05-09 RX ADMIN — Medication 5.88 MILLIGRAM(S): at 00:12

## 2024-05-09 RX ADMIN — Medication 650 MILLIGRAM(S): at 18:13

## 2024-05-09 RX ADMIN — Medication 650 MILLIGRAM(S): at 03:44

## 2024-05-09 RX ADMIN — SODIUM CHLORIDE 3 MILLILITER(S): 9 INJECTION INTRAMUSCULAR; INTRAVENOUS; SUBCUTANEOUS at 05:23

## 2024-05-09 RX ADMIN — Medication 650 MILLIGRAM(S): at 18:36

## 2024-05-09 RX ADMIN — Medication 650 MILLIGRAM(S): at 22:52

## 2024-05-09 RX ADMIN — CHLORHEXIDINE GLUCONATE 1 APPLICATION(S): 213 SOLUTION TOPICAL at 22:12

## 2024-05-09 RX ADMIN — SODIUM CHLORIDE 3 MILLILITER(S): 9 INJECTION INTRAMUSCULAR; INTRAVENOUS; SUBCUTANEOUS at 00:03

## 2024-05-09 RX ADMIN — Medication 650 MILLIGRAM(S): at 22:47

## 2024-05-09 RX ADMIN — Medication 650 MILLIGRAM(S): at 05:23

## 2024-05-09 RX ADMIN — ESLICARBAZEPINE ACETATE 1600 MILLIGRAM(S): 800 TABLET ORAL at 20:57

## 2024-05-09 RX ADMIN — Medication 650 MILLIGRAM(S): at 00:01

## 2024-05-09 RX ADMIN — ONDANSETRON 8 MILLIGRAM(S): 8 TABLET, FILM COATED ORAL at 08:48

## 2024-05-09 RX ADMIN — SODIUM CHLORIDE 3 MILLILITER(S): 9 INJECTION INTRAMUSCULAR; INTRAVENOUS; SUBCUTANEOUS at 12:00

## 2024-05-09 NOTE — PROGRESS NOTE PEDS - SUBJECTIVE AND OBJECTIVE BOX
Interval/Overnight Events: Epileptiform activity captured, antiepileptics resumed  _________________________________________________________________  Respiratory:  RA  _________________________________________________________________  Cardiac:  Cardiac Rhythm: Sinus rhythm  ________________________________________________________________  Fluids/Electrolytes/Nutrition:  I&O's Summary    08 May 2024 07:01  -  09 May 2024 07:00  --------------------------------------------------------  IN: 1715 mL / OUT: 3225 mL / NET: -1510 mL    Diet: Regular  senna Oral Liquid - Peds 10 milliLiter(s) Oral at bedtime PRN  sodium chloride 0.9% lock flush - Peds 3 milliLiter(s) IV Push every 6 hours  _________________________________________________________________  Neurologic:  Adequacy of sedation and pain control has been assessed and adjusted  acetaminophen   Oral Tab/Cap - Peds. 650 milliGRAM(s) Oral every 6 hours PRN  LORazepam IV Push - Peds 4 milliGRAM(s) IV Push once PRN  oxyCODONE   IR Oral Tab/Cap - Peds 5 milliGRAM(s) Oral once PRN  ________________________________________________________________  Access: See plan  Necessity of urinary, arterial, and venous catheters discussed  _________________________________________________________________  PE:  T(C): 36.8 (05-09-24 @ 07:39), Max: 36.9 (05-08-24 @ 20:00)  HR: 110 (05-09-24 @ 07:39) (78 - 155)  BP: 115/75 (05-09-24 @ 07:39) (105/62 - 124/78)  RR: 20 (05-09-24 @ 07:39) (14 - 21)  SpO2: 98% (05-09-24 @ 07:39) (96% - 100%)    General:	No distress  Respiratory:      Effort even and unlabored. Clear bilaterally.   CV:                   Regular rate and rhythm. Normal S1/S2. No murmurs, rubs, or   .                       gallop. Capillary refill < 2 seconds. Distal pulses 2+ and equal.  Abdomen:	Soft, non-distended.  Skin:		No rashes.  Extremities:	Warm and well perfused.   Neurologic:	Alert.  No acute change from baseline exam.  ________________________________________________________________  Patient and Parent/Guardian was updated as to the progress/plan of care.    The patient remains in critical and unstable condition, and requires ICU care and monitoring. Total critical care time spent by attending physician was 35 minutes, excluding procedure time.

## 2024-05-09 NOTE — EEG REPORT - NS EEG TEXT BOX
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     Medication:   None    Home Regimen: ESL 1600mg qHS    Recording Technique:  The patient underwent continuous Video-EEG monitoring, using Telemetry System hardware on the XLTek Digital System.  EEG and video data were stored on a computer hard drive with important events saved in digital archive files. The material was reviewed by a physician (electroencephalographer / epileptologist) on a daily basis.  Cole and seizure detection algorithms were utilized and reviewed.  An EEG Technician attended to the patient for 8 to 10 hours per day. The patient was observed by the epilepsy nursing staff 24-hours per day. The epilepsy center neurologist was available in person or on call 24-hours per day.    Placement and Labeling of Electrodes:  1. Pars Marginalis Anterior (PMA)- 10  2. Middle Temporal Amygdala (MTA)- 12  3. Middle Temporal Hippocampal Head (MTHH)- 12  4. Middle Temporal Hippocampal Body (MTHB)- 12  5. Posterior Temporal (PT)- 10  6. Anterior Parietal (APar)- 14  7. Middle Frontal Anterior Cingulate (MFAC)- 12  8. Middle Frontal Middle Cingulate (MFMC)- 12  9. Middle Frontal Posterior Cingulate (MFPC)- 14  10. Pars Marginalis Posterior (PMPo)- 8  11. Pars Marginalis Superior (PMSu)- 8  12. Pars Marginalis Inferior (PMIn)- 10  13. Superior Insula (SI)- 8  14. Inferior Insula (II)- 10  EKG 2  Total Leads: 154    The EEG was performed utilizing intracranial electrodes. Recording was at a sampling rate of 500-1000 samples per second per channel.  Time synchronized digital video recording was done simultaneously with EEG recording.  A low light infrared camera was used for low light recording.  Cole and seizure detection algorithms were utilized and additionally reviewed.    The montage for the intracranial electrodes was as the following:  Page 1: ALL  Page 2: Temporal 2, 3, 4, 5  Page 3: Cinguloparietal 6, 7, 8, 9  Page 4: Pars Marginalis 1, 10, 11, 12  Page 5: Insula 13, 14    Date & time:   Start: 05-08-24 08:00  End: 05-09-24 08:00    TECHNICAL LIMITATIONS:   MTHH1     FINDINGS: The background consisted of mostly mixed alpha, beta, theta frequencies of sinusoidal appearance.    Interictal abnormalities: Frequent 2.5-3Hz periodic spike polyspike discharges over the medial contacts of the pars marginalis (PMA2-5, PMSu2-4, PMPo3-5, PMIn 2-4).   Ictal abnormalities: Two stereotypical seizures captured with electrographic onset at PMA1-4, PMSu1-6, PMIn1-5, PMPo1-6 characterized by a brief burst of fast activity followed by attenuation of these regions for 5-6 seconds, during which the clinical presentation of RUE dystonic posturing begins and slowly elevates with intact awareness. Subsequently, low voltage fast activity is noted in these regions (pars marginalis contacts) prior to offset. These seizures lasted 34 and 28 seconds respectively. The clinical and electrographic features are described below:     d2 03:03:21	0:34.1	Seizure  d2 03:03:21		PMA1-4, PMSu1-6, PMIn1-5, PMPo1-6 brief fast onset  d2 03:03:22		burst of fast activity in medial contacts of PM  d2 03:03:23		MPFC 8-9  d2 03:03:23		eyes open  d2 03:03:24		LUE brief jerk  d2 03:03:24		attenuation of the background  d2 03:03:27		eyes open looking to the left  d2 03:03:29		APar2-5  d2 03:03:31		RUE dystonic and elevating, eyes continue to be fixed  d2 03:03:39		RUE fixed in flexed posturing  d2 03:03:41		Patient Event  d2 03:03:53		supermiposed clonic activity of the RUE  d2 03:03:56		off    d2 04:55:17		increased spiking in PM leads, medial  d2 04:55:32	0:28.5	Seizure  d2 04:55:32		eyes open, brief jerk, attenuation of background  d2 04:55:39		PMIn1-6, PMSu1-6, PMPo1-7, PMA1-6 LVFA  d2 04:55:39		alerting MOA that the seizure is happening  d2 04:55:41		RUE dystonic posturing, slowly elevating  d2 04:55:47		Patient Event  d2 04:56:02		stops    EEG Summary/Classification:  Abnormal/Normal intracranial EEG due to :         Interictal abnormalities: Frequent 2.5-3Hz periodic spike polyspike discharges over the medial contacts of the pars marginalis (PMA2-5, PMSu2-4, PMPo3-5, PMIn 2-4).          Ictal abnormalities: Two stereotypical seizures captured with electrographic onset at PMA1-4, PMSu1-6, PMIn1-5, PMPo1-6 lasting 34 and 28 seconds respectively.    Impression/Clinical Correlate: The findings of this stereotactic EEG indicate a seizure onset zone and cortical irritability in the medial contacts of the left pars marginalis in the area of known focal cortical dysplasia.     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     Medication:   None    Home Regimen: ESL 1600mg qHS    Recording Technique:  The patient underwent continuous Video-EEG monitoring, using Telemetry System hardware on the XLTek Digital System.  EEG and video data were stored on a computer hard drive with important events saved in digital archive files. The material was reviewed by a physician (electroencephalographer / epileptologist) on a daily basis.  Cole and seizure detection algorithms were utilized and reviewed.  An EEG Technician attended to the patient for 8 to 10 hours per day. The patient was observed by the epilepsy nursing staff 24-hours per day. The epilepsy center neurologist was available in person or on call 24-hours per day.    Placement and Labeling of Electrodes:  1. Pars Marginalis Anterior (PMA)- 10  2. Middle Temporal Amygdala (MTA)- 12  3. Middle Temporal Hippocampal Head (MTHH)- 12  4. Middle Temporal Hippocampal Body (MTHB)- 12  5. Posterior Temporal (PT)- 10  6. Anterior Parietal (APar)- 14  7. Middle Frontal Anterior Cingulate (MFAC)- 12  8. Middle Frontal Middle Cingulate (MFMC)- 12  9. Middle Frontal Posterior Cingulate (MFPC)- 14  10. Pars Marginalis Posterior (PMPo)- 8  11. Pars Marginalis Superior (PMSu)- 8  12. Pars Marginalis Inferior (PMIn)- 10  13. Superior Insula (SI)- 8  14. Inferior Insula (II)- 10  EKG 2  Total Leads: 154    The EEG was performed utilizing intracranial electrodes. Recording was at a sampling rate of 500-1000 samples per second per channel.  Time synchronized digital video recording was done simultaneously with EEG recording.  A low light infrared camera was used for low light recording.  Cole and seizure detection algorithms were utilized and additionally reviewed.    The montage for the intracranial electrodes was as the following:  Page 1: ALL  Page 2: Temporal 2, 3, 4, 5  Page 3: Cinguloparietal 6, 7, 8, 9  Page 4: Pars Marginalis 1, 10, 11, 12  Page 5: Insula 13, 14    Date & time:   Start: 05-08-24 08:00  End: 05-09-24 08:00    TECHNICAL LIMITATIONS:   MTHH1     FINDINGS: The background consisted of mostly mixed alpha, beta, theta frequencies of sinusoidal appearance.    Interictal abnormalities: Frequent 2.5-3Hz periodic spike polyspike discharges over the medial contacts of the pars marginalis (PMA2-5, PMSu2-4, PMPo3-5, PMIn 2-4).   Ictal abnormalities: Three stereotypical seizures captured with electrographic onset at PMA1-4, PMSu1-6, PMIn1-5, PMPo1-6 characterized by a brief burst of fast activity followed by attenuation of these regions for 5-6 seconds, during which the clinical presentation of RUE dystonic posturing begins and slowly elevates with intact awareness. Subsequently, low voltage fast activity is noted in these regions (pars marginalis contacts) prior to offset. These seizures lasted 34, 28, and 22 seconds respectively. The clinical and electrographic features of the first two seizures are described below (third seizure similar electrographic findings, video obscured):     d2 03:03:21	0:34.1	Seizure  d2 03:03:21		PMA1-4, PMSu1-6, PMIn1-5, PMPo1-6 brief fast onset  d2 03:03:22		burst of fast activity in medial contacts of PM  d2 03:03:23		MPFC 8-9  d2 03:03:23		eyes open  d2 03:03:24		LUE brief jerk  d2 03:03:24		attenuation of the background  d2 03:03:27		eyes open looking to the left  d2 03:03:29		APar2-5  d2 03:03:31		RUE dystonic and elevating, eyes continue to be fixed  d2 03:03:39		RUE fixed in flexed posturing  d2 03:03:41		Patient Event  d2 03:03:53		supermiposed clonic activity of the RUE  d2 03:03:56		off    d2 04:55:17		increased spiking in PM leads, medial  d2 04:55:32	0:28.5	Seizure  d2 04:55:32		eyes open, brief jerk, attenuation of background  d2 04:55:39		PMIn1-6, PMSu1-6, PMPo1-7, PMA1-6 LVFA  d2 04:55:39		alerting MOA that the seizure is happening  d2 04:55:41		RUE dystonic posturing, slowly elevating  d2 04:55:47		Patient Event  d2 04:56:02		stops    EEG Summary/Classification:  Abnormal/Normal intracranial EEG due to :         Interictal abnormalities: Frequent 2.5-3Hz periodic spike polyspike discharges over the medial contacts of the pars marginalis (PMA2-5, PMSu2-4, PMPo3-5, PMIn 2-4).          Ictal abnormalities: Three stereotypical seizures captured with electrographic onset at PMA1-4, PMSu1-6, PMIn1-5, PMPo1-6 lasting 34, 28, and 22 seconds respectively.    Impression/Clinical Correlate: The findings of this stereotactic EEG indicate a seizure onset zone and cortical irritability in the medial contacts of the left pars marginalis in the area of known focal cortical dysplasia.     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     Medication:   None    Home Regimen: ESL 1600mg qHS    Recording Technique:  The patient underwent continuous Video-EEG monitoring, using Telemetry System hardware on the XLTek Digital System.  EEG and video data were stored on a computer hard drive with important events saved in digital archive files. The material was reviewed by a physician (electroencephalographer / epileptologist) on a daily basis.  Cole and seizure detection algorithms were utilized and reviewed.  An EEG Technician attended to the patient for 8 to 10 hours per day. The patient was observed by the epilepsy nursing staff 24-hours per day. The epilepsy center neurologist was available in person or on call 24-hours per day.    Placement and Labeling of Electrodes:  1. Pars Marginalis Anterior (PMA)- 10  2. Middle Temporal Amygdala (MTA)- 12  3. Middle Temporal Hippocampal Head (MTHH)- 12  4. Middle Temporal Hippocampal Body (MTHB)- 12  5. Posterior Temporal (PT)- 10  6. Anterior Parietal (APar)- 14  7. Middle Frontal Anterior Cingulate (MFAC)- 12  8. Middle Frontal Middle Cingulate (MFMC)- 12  9. Middle Frontal Posterior Cingulate (MFPC)- 14  10. Pars Marginalis Posterior (PMPo)- 8  11. Pars Marginalis Superior (PMSu)- 8  12. Pars Marginalis Inferior (PMIn)- 10  13. Superior Insula (SI)- 8  14. Inferior Insula (II)- 10  EKG 2  Total Leads: 154    The EEG was performed utilizing intracranial electrodes. Recording was at a sampling rate of 500-1000 samples per second per channel.  Time synchronized digital video recording was done simultaneously with EEG recording.  A low light infrared camera was used for low light recording.  Cole and seizure detection algorithms were utilized and additionally reviewed.    The montage for the intracranial electrodes was as the following:  Page 1: ALL  Page 2: Temporal 2, 3, 4, 5  Page 3: Cinguloparietal 6, 7, 8, 9  Page 4: Pars Marginalis 1, 10, 11, 12  Page 5: Insula 13, 14    Date & time:   Start: 05-08-24 08:00  End: 05-09-24 08:00    TECHNICAL LIMITATIONS:   MTHH1     FINDINGS: The background consisted of mostly mixed alpha, beta, theta frequencies of sinusoidal appearance.    Interictal abnormalities: Frequent 2.5-3Hz periodic spike polyspike discharges over the medial contacts of the pars marginalis (PMA2-5, PMSu2-4, PMPo3-5, PMIn 2-4).   Ictal abnormalities: Three stereotypical seizures captured with electrographic onset at PMA1-4, PMSu1-6, PMIn1-5, PMPo1-6 characterized by a brief burst of fast activity followed by attenuation of these regions for 5-6 seconds, during which the clinical presentation of RUE dystonic posturing begins and slowly elevates with intact awareness. Subsequently, low voltage fast activity is noted in these regions (pars marginalis contacts) prior to offset. These seizures lasted 34, 28, and 22 seconds respectively. The clinical and electrographic features of the first two seizures are described below (third seizure similar electrographic findings, video obscured):     d2 03:03:21	0:34.1	Seizure  d2 03:03:21		PMA1-4, PMSu1-6, PMIn1-5, PMPo1-6 brief fast onset  d2 03:03:22		burst of fast activity in medial contacts of PM  d2 03:03:23		MPFC 8-9  d2 03:03:23		eyes open  d2 03:03:24		LUE brief jerk  d2 03:03:24		attenuation of the background  d2 03:03:27		eyes open looking to the left  d2 03:03:29		APar2-5  d2 03:03:31		RUE dystonic and elevating, eyes continue to be fixed  d2 03:03:39		RUE fixed in flexed posturing  d2 03:03:41		Patient Event  d2 03:03:53		supermiposed clonic activity of the RUE  d2 03:03:56		off    d2 04:55:17		increased spiking in PM leads, medial  d2 04:55:32	0:28.5	Seizure  d2 04:55:32		eyes open, brief jerk, attenuation of background  d2 04:55:39		PMIn1-6, PMSu1-6, PMPo1-7, PMA1-6 LVFA  d2 04:55:39		alerting MOA that the seizure is happening  d2 04:55:41		RUE dystonic posturing, slowly elevating  d2 04:55:47		Patient Event  d2 04:56:02		stops    EEG Summary/Classification:  Abnormal/Normal intracranial EEG due to :         Interictal abnormalities: Frequent 2.5-3Hz periodic spike polyspike discharges over the medial contacts of the pars marginalis (PMA2-5, PMSu2-4, PMPo3-5, PMIn 2-4).          Ictal abnormalities: Three stereotypical seizures captured with electrographic onset at PMA1-4, PMSu1-6, PMIn1-5, PMPo1-6 lasting 34, 28, and 22 seconds respectively.    Impression/Clinical Correlate: The findings of this stereotactic EEG indicate a seizure onset zone and cortical irritability in the medial contacts of the left pars marginalis in the area of known focal cortical dysplasia.       Salinas Kelly MD  PGY-6, Pediatric Epilepsy Fellow     Jerry Wset MD  Attending Physician   Pediatric Neurology/Epilepsy

## 2024-05-09 NOTE — PROGRESS NOTE PEDS - SUBJECTIVE AND OBJECTIVE BOX
PAST 24hr EVENTS: Patient seen and examined with mother at bedside. 2 seizures overnight.    Vital Signs Last 24 Hrs  T(C): 36.9 (08 May 2024 05:00), Max: 37.4 (07 May 2024 22:57)  T(F): 98.4 (08 May 2024 05:00), Max: 99.3 (07 May 2024 22:57)  HR: 93 (08 May 2024 05:00) (71 - 100)  BP: 110/76 (08 May 2024 05:00) (97/51 - 117/73)  BP(mean): 86 (08 May 2024 05:00) (66 - 88)  RR: 14 (08 May 2024 05:00) (12 - 18)  SpO2: 100% (08 May 2024 05:00) (98% - 100%)    Parameters below as of 08 May 2024 05:00  Patient On (Oxygen Delivery Method): room air      I&O's Summary    06 May 2024 07:01  -  07 May 2024 07:00  --------------------------------------------------------  IN: 666.5 mL / OUT: 2150 mL / NET: -1483.5 mL    07 May 2024 07:01  -  08 May 2024 06:41  --------------------------------------------------------  IN: 0 mL / OUT: 2800 mL / NET: -2800 mL    MEDICATIONS  (STANDING):  sodium chloride 0.9% lock flush - Peds 3 milliLiter(s) IV Push every 6 hours    MEDICATIONS  (PRN):  acetaminophen   Oral Tab/Cap - Peds. 650 milliGRAM(s) Oral every 6 hours PRN Temp greater or equal to 38 C (100.4 F), Mild Pain (1 - 3), Moderate Pain (4 - 6), Severe Pain (7 - 10)  LORazepam IV Push - Peds 4 milliGRAM(s) IV Push once PRN seizure  oxyCODONE   IR Oral Tab/Cap - Peds 5 milliGRAM(s) Oral once PRN Severe Pain (7 - 10)  senna Oral Liquid - Peds 10 milliLiter(s) Oral at bedtime PRN Constipation      PHYSICAL EXAM:   AOx3, appropriate, follows commands  PERRL, EOMI, face symmetrical   BONDS x 4 with good strength   Sensation intact to light touch   No pronator drift   head wrap in place

## 2024-05-09 NOTE — PROGRESS NOTE PEDS - SUBJECTIVE AND OBJECTIVE BOX
Reason for Visit:     [x ] History per: patient      Interval History/ROS:  Overnight patient w/ 4 electroclinical seizures of typical semiology (RUE dystonic posturing) noted on EEG. Restarted home ASMs.       Diet: Diet, NPO after Midnight - Pediatric:      NPO Start Date: 09-May-2024,   NPO Start Time: 23:59 (05-09-24 @ 08:05)  Diet, Regular - Pediatric (05-06-24 @ 13:08)    MEDICATIONS  (STANDING):  eslicarbazepine Oral Tab/Cap - Peds 1600 milliGRAM(s) Oral at bedtime  LORazepam IV Push - Peds 2 milliGRAM(s) IV Push once  sodium chloride 0.9% lock flush - Peds 3 milliLiter(s) IV Push every 6 hours    MEDICATIONS  (PRN):  acetaminophen   Oral Tab/Cap - Peds. 650 milliGRAM(s) Oral every 6 hours PRN Temp greater or equal to 38 C (100.4 F), Mild Pain (1 - 3), Moderate Pain (4 - 6), Severe Pain (7 - 10)  LORazepam IV Push - Peds 4 milliGRAM(s) IV Push once PRN seizure  oxyCODONE   IR Oral Tab/Cap - Peds 5 milliGRAM(s) Oral once PRN Severe Pain (7 - 10)  senna Oral Liquid - Peds 10 milliLiter(s) Oral at bedtime PRN Constipation    Vital Signs Last 24 Hrs  T(C): 37.2 (09 May 2024 17:03), Max: 37.2 (09 May 2024 17:03)  T(F): 98.9 (09 May 2024 17:03), Max: 98.9 (09 May 2024 17:03)  HR: 118 (09 May 2024 17:03) (78 - 155)  BP: 115/74 (09 May 2024 17:03) (105/62 - 124/78)  BP(mean): 87 (09 May 2024 17:03) (75 - 92)  RR: 18 (09 May 2024 17:03) (14 - 21)  SpO2: 100% (09 May 2024 17:03) (96% - 100%)    Parameters below as of 09 May 2024 14:00  Patient On (Oxygen Delivery Method): room air      Daily     Daily     I&O's Summary    08 May 2024 07:01  -  09 May 2024 07:00  --------------------------------------------------------  IN: 1715 mL / OUT: 3225 mL / NET: -1510 mL    09 May 2024 07:01  -  09 May 2024 17:29  --------------------------------------------------------  IN: 200 mL / OUT: 275 mL / NET: -75 mL        GENERAL PHYSICAL EXAM  General: well nourished, not in acute distress.  HEENT:	head wrapped w/ SEEG leads, external ear normal, nasal mucosa normal, oral pharynx clear  Neck:          supple, full range of motion, no nuchal rigidity  Extremities:	no joint swelling, erythema, tenderness; normal ROM, no contractures  Skin:		no rash    NEUROLOGIC EXAM  Mental Status:    alert, appropriately oriented and making normal conversation.   Cranial Nerves:   PERRL, EOMI, no facial asymmetry , V1-V3 intact , symmetric palate, tongue midline.   Visual Fields:		Full visual field  Muscle Strength:	 Full strength 5/5, proximal and distal,  upper and lower extremities  Muscle Tone:	Normal tone  Deep Tendon Reflexes:         2+/4  : Biceps, Brachioradialis, Triceps Bilateral;  2+/4 : Pattelar, Ankle bilateral. No clonus.  Plantar Response:	Plantar reflexes flexion bilaterally  Sensation:		Intact to pain, light touch, temperature and vibration throughout.  Coordination/	No dysmetria in finger to nose test bilaterally  Tandem Gait/Romberg	did not assess.     Lab Results:              EEG Results:    EEG Summary/Classification:  Abnormal/Normal intracranial EEG due to :         Interictal abnormalities: Frequent 2.5-3Hz periodic spike polyspike discharges over the medial contacts of the pars marginalis (PMA2-5, PMSu2-4, PMPo3-5, PMIn 2-4).          Ictal abnormalities: Three stereotypical seizures captured with electrographic onset at PMA1-4, PMSu1-6, PMIn1-5, PMPo1-6 lasting 34, 28, and 22 seconds respectively.    Results: A continuous electrocorticographic signal was recorded in all the channels with a replication of patient's seizure noted at minimal stimulation parameters on first trial at PMA3-4 and presentation of clinical incomplete symptoms of her usual seizure semiology at APar3-4 first and second trials. Rapid clinical presentation with motor preceding the typical somatosensory onset seen with first trial of stimulation at MFPC8-9.      Impression: The captured seizure is consistent with involvement of the medial leads of the left pars marginalis as part of the epileptogenic zone. Minimal involvement of the anterior parietal leads was seen with the stimulation at the medial leads of the left anterior parietal contacts, with later spread into the pars marginalis medial contacts. 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   Imaging Studies:

## 2024-05-09 NOTE — EEG REPORT - NS EEG TEXT BOX
[facility]   Division of Pediatric Neurology     REPORT OF ELECTROCORTICOGRAPHY (ECoG) – Electrical Stimulation    Referring Physician:  Dr. Mann/Jenna    Indication:    Treatment-resistant, lesional epilepsy    Medications: None    Technique: The patient underwent stereotactic intracranial electrode placement over the regions below. Standard intracranial EEG recording parameters were used.  Recording was at a sampling rate of 500-1000 samples per second per channel. Location was determined with the use of surgical planning software by the neurosurgeon. Electrical stimulation using Xetallet Cortical Stimulator was set up using biphasic probe at varying frequencies, duration and set stimulation.     Stereo EEG using 154 electrodes were placed as follows:    Placement and Labeling of Electrodes:  1. Pars Marginalis Anterior (PMA)- 10  2. Middle Temporal Amygdala (MTA)- 12  3. Middle Temporal Hippocampal Head (MTHH)- 12  4. Middle Temporal Hippocampal Body (MTHB)- 12  5. Posterior Temporal (PT)- 10  6. Anterior Parietal (APar)- 14  7. Middle Frontal Anterior Cingulate (MFAC)- 12  8. Middle Frontal Middle Cingulate (MFMC)- 12  9. Middle Frontal Posterior Cingulate (MFPC)- 14  10. Pars Marginalis Posterior (PMPo)- 8  11. Pars Marginalis Superior (PMSu)- 8  12. Pars Marginalis Inferior (PMIn)- 10  13. Superior Insula (SI)- 8  14. Inferior Insula (II)- 10  EKG 2    Start/End Time: 05/09/24 13:04:33 - 13:39:52  The following describes localized stimulation sites (MFPC8-9, APar3-4, PMA3-4) with the respective settings and attempt number.            Time       Duration	                    Title  d1 13:04:33		MFPC8-9  50Hz/1s/3s/2mA Trial 1  d1 13:04:38		after discharges at MFPC8-9  Presented with clinical semiology of right upper extremity pulsation and movements followed by somatosensory changes    d1 13:08:25		APar3-4 50Hz/1s/3s/1mA Trial 1  d1 13:08:29		no AD at APar, involves PMA3-4, PMPo1-6, PMSu1-6, PMIn1-5  Presented with clinical semiology of RUE floating feeling/light sensation    d1 13:09:59		APar3-4 50Hz/1s/3s/2mA Trial 2  d1 13:10:12		PMPo3-6, PM1-5 LVFA  d1 13:10:13		LVFA PMSU1-6, PMA1-4  d1 13:10:19		spread through PMA1-18, PMPo1-7, PMSu1-6, PMIn1-5  Presented with clinical semiology of RUE floating feeling/light sensation with addition of numbness compared to Trial 1    d1 13:17:13		PMA3-4 50Hz/1s/3s/1mA Trial 1  d1 13:17:23		LVFA at PMSu3-5, PMA1-5  d1 13:17:24		LVFA recruited at PMPo1-3  d1 13:17:28		LVFA recruited at PMIn1-5  Replicated typical clinical semiology of RUE numbness/floating followed by dystonic posturing of the RUE with superimposed clonic activity    Results: A continuous electrocorticographic signal was recorded in all the channels with a replication of patient's seizure noted at minimal stimulation parameters on first trial at PMA3-4 and presentation of clinical incomplete symptoms of her usual seizure semiology at APar3-4 first and second trials. Rapid clinical presentation with motor preceding the typical somatosensory onset seen with first trial of stimulation at MFPC8-9.      Impression: The captured seizure is consistent with involvement of the medial leads of the left pars marginalis as part of the epileptogenic zone. Minimal involvement of the anterior parietal leads was seen with the stimulation at the medial leads of the left anterior parietal contacts, with later spread into the pars marginalis medial contacts. 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     ***THIS IS A PRELIMINARY FELLOW REPORT PENDING REVIEW WITH ATTENDING EPILEPTOLOGIST***    Salinas Kelly MD  PGY-6, Pediatric Epilepsy Fellow    [facility]   Division of Pediatric Neurology     REPORT OF ELECTROCORTICOGRAPHY (ECoG) – Electrical Stimulation    Referring Physician:  Dr. Mann/Jenna    Indication:    Treatment-resistant, lesional epilepsy    Medications: None    Technique: The patient underwent stereotactic intracranial electrode placement over the regions below. Standard intracranial EEG recording parameters were used.  Recording was at a sampling rate of 500-1000 samples per second per channel. Location was determined with the use of surgical planning software by the neurosurgeon. Electrical stimulation using Translimitlet Cortical Stimulator was set up using biphasic probe at varying frequencies, duration and set stimulation.     Stereo EEG using 154 electrodes were placed as follows:    Placement and Labeling of Electrodes:  1. Pars Marginalis Anterior (PMA)- 10  2. Middle Temporal Amygdala (MTA)- 12  3. Middle Temporal Hippocampal Head (MTHH)- 12  4. Middle Temporal Hippocampal Body (MTHB)- 12  5. Posterior Temporal (PT)- 10  6. Anterior Parietal (APar)- 14  7. Middle Frontal Anterior Cingulate (MFAC)- 12  8. Middle Frontal Middle Cingulate (MFMC)- 12  9. Middle Frontal Posterior Cingulate (MFPC)- 14  10. Pars Marginalis Posterior (PMPo)- 8  11. Pars Marginalis Superior (PMSu)- 8  12. Pars Marginalis Inferior (PMIn)- 10  13. Superior Insula (SI)- 8  14. Inferior Insula (II)- 10  EKG 2    Start/End Time: 05/09/24 13:04:33 - 13:39:52  The following describes localized stimulation sites (MFPC8-9, APar3-4, PMA3-4) with the respective settings and attempt number.            Time       Duration	                    Title  d1 13:04:33		MFPC8-9  50Hz/1s/3s/2mA Trial 1  d1 13:04:38		after discharges at MFPC8-9  Presented with clinical semiology of right upper extremity pulsation and movements followed by somatosensory changes    d1 13:08:25		APar3-4 50Hz/1s/3s/1mA Trial 1  d1 13:08:29		no AD at APar, involves PMA3-4, PMPo1-6, PMSu1-6, PMIn1-5  Presented with clinical semiology of RUE floating feeling/light sensation    d1 13:09:59		APar3-4 50Hz/1s/3s/2mA Trial 2  d1 13:10:12		PMPo3-6, PM1-5 LVFA  d1 13:10:13		LVFA PMSU1-6, PMA1-4  d1 13:10:19		spread through PMA1-18, PMPo1-7, PMSu1-6, PMIn1-5  Presented with clinical semiology of RUE floating feeling/light sensation with addition of numbness compared to Trial 1    d1 13:17:13		PMA3-4 50Hz/1s/3s/1mA Trial 1  d1 13:17:23		LVFA at PMSu3-5, PMA1-5  d1 13:17:24		LVFA recruited at PMPo1-3  d1 13:17:28		LVFA recruited at PMIn1-5  Replicated typical clinical semiology of RUE numbness/floating followed by dystonic posturing of the RUE with superimposed clonic activity    Results: A continuous electrocorticographic signal was recorded in all the channels with a replication of patient's seizure noted at minimal stimulation parameters on first trial at PMA3-4 and presentation of clinical incomplete symptoms of her usual seizure semiology at APar3-4 first and second trials. Rapid clinical presentation with motor preceding the typical somatosensory onset seen with first trial of stimulation at MFPC8-9.      Impression: The captured seizure is consistent with involvement of the medial leads of the left pars marginalis as part of the epileptogenic zone. Minimal involvement of the anterior parietal leads was seen with the stimulation at the medial leads of the left anterior parietal contacts, with later spread into the pars marginalis medial contacts. 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     Salinas Kelly MD  PGY-6, Pediatric Epilepsy Fellow     Jerry West MD  Attending Physician   Pediatric Neurology/Epilepsy

## 2024-05-09 NOTE — PROGRESS NOTE PEDS - ASSESSMENT
19yoF with focal epilepsy admitted after JAMILA guided stereotactic EEG placement, no seizures of yet.    Resuming AED's under neurology service guidance  stereotactic EEG  Regular diet  OR tomorrow for lead removal 19yoF with focal epilepsy admitted after JAMILA guided stereotactic EEG placement, epileptiform activity captured    Resuming AED's under neurology service guidance  stereotactic EEG  Regular diet  OR tomorrow for lead removal

## 2024-05-09 NOTE — PROGRESS NOTE PEDS - ATTENDING COMMENTS
Interval history was reviewed with patient and/or family (caretakers) and/or nursing and/or house staff as appropriate. Neurological examination was performed.  Any paraclinical testing performed in the interval was reviewed including laboratory studies, electroencephalographic recordings and neuroimaging if performed. Diagnostic and treatment plan was discussed with patient, and/or family (caretakers),and/or house staff and/or nursing as appropriate.

## 2024-05-09 NOTE — PROGRESS NOTE PEDS - ASSESSMENT
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 Patient is currently managed on eslicabazepine 1600mg and cenobamate 25mg qD w/ daily with seizures. Patient is clinically stable with a nonfocal neurologic examination. Patient with left-sided signal abnormality in the pars marginalis and focal hypometabolism signal aberration in the left pars marginalis on PET MRI. Patient w/ 4 focal seizures w/ intact awareness recorded on EEG overnight arising from the L mdial pars marginalis. Performed intracranial stimulation later in the day which indicating that epileptogenic foci is likely the medial pars marginalis which is consistent w/ her focal cortical dysplasia. Plan for explantation tomorrow.       Plan:    Focal seizures:  -  Restart home eslicarbazepine 1600mg qD,   - Restart home cenobamate 25mg qD  - Plan for explantation w/ neurosurgery tomorrow  - Ativan 4mg PRN for seizures lasting longer than 5 minutes, please alert neurology fellow prior to giving  - seizure precautions  - 1:1 supervision during admission  - cont. pulse ox  - cardiac bedside monitor    JAXONI:   - regular diet    Patient discussed with Dr. 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 Patient is currently managed on eslicabazepine 1600mg and cenobamate 25mg qD w/ daily with seizures. Patient is clinically stable with a nonfocal neurologic examination. Patient with left-sided signal abnormality in the pars marginalis and focal hypometabolism signal aberration in the left pars marginalis on PET MRI. Patient w/ 4 focal seizures w/ intact awareness recorded on EEG overnight arising from the L mdial pars marginalis. Performed intracranial stimulation later in the day which indicating that epileptogenic foci is likely the medial pars marginalis which is consistent w/ her focal cortical dysplasia. Plan for explantation tomorrow.       Plan:    Focal seizures:  -  Restart home eslicarbazepine 1600mg qD,   - Plan for explantation w/ neurosurgery tomorrow  - Ativan 4mg PRN for seizures lasting longer than 5 minutes, please alert neurology fellow prior to giving  - seizure precautions  - 1:1 supervision during admission  - cont. pulse ox  - cardiac bedside monitor    FENGI:   - regular diet    Patient discussed with Dr. West, Pediatric Neurology Attending

## 2024-05-09 NOTE — PROGRESS NOTE PEDS - ASSESSMENT
19 year old F with epilepsy Admitted for elective SEEG placement        5/6 OR for SEEG placement  5/7 Off seizure meds  5/8 off seizure meds, no seizure activity yet  5/9 2 seizures overnight

## 2024-05-10 ENCOUNTER — TRANSCRIPTION ENCOUNTER (OUTPATIENT)
Age: 19
End: 2024-05-10

## 2024-05-10 VITALS — HEART RATE: 91 BPM | OXYGEN SATURATION: 100 % | RESPIRATION RATE: 15 BRPM

## 2024-05-10 LAB
ANION GAP SERPL CALC-SCNC: 13 MMOL/L — SIGNIFICANT CHANGE UP (ref 7–14)
APTT BLD: 25 SEC — SIGNIFICANT CHANGE UP (ref 24.5–35.6)
BASOPHILS # BLD AUTO: 0.05 K/UL — SIGNIFICANT CHANGE UP (ref 0–0.2)
BASOPHILS NFR BLD AUTO: 0.5 % — SIGNIFICANT CHANGE UP (ref 0–2)
BLD GP AB SCN SERPL QL: NEGATIVE — SIGNIFICANT CHANGE UP
BUN SERPL-MCNC: 8 MG/DL — SIGNIFICANT CHANGE UP (ref 7–23)
CALCIUM SERPL-MCNC: 8.6 MG/DL — SIGNIFICANT CHANGE UP (ref 8.4–10.5)
CHLORIDE SERPL-SCNC: 105 MMOL/L — SIGNIFICANT CHANGE UP (ref 98–107)
CO2 SERPL-SCNC: 20 MMOL/L — LOW (ref 22–31)
CREAT SERPL-MCNC: 0.47 MG/DL — LOW (ref 0.5–1.3)
EGFR: 140 ML/MIN/1.73M2 — SIGNIFICANT CHANGE UP
EOSINOPHIL # BLD AUTO: 0.52 K/UL — HIGH (ref 0–0.5)
EOSINOPHIL NFR BLD AUTO: 5.2 % — SIGNIFICANT CHANGE UP (ref 0–6)
GLUCOSE SERPL-MCNC: 106 MG/DL — HIGH (ref 70–99)
HCT VFR BLD CALC: 37.9 % — SIGNIFICANT CHANGE UP (ref 34.5–45)
HGB BLD-MCNC: 12.4 G/DL — SIGNIFICANT CHANGE UP (ref 11.5–15.5)
IANC: 4.91 K/UL — SIGNIFICANT CHANGE UP (ref 1.8–7.4)
IMM GRANULOCYTES NFR BLD AUTO: 0.3 % — SIGNIFICANT CHANGE UP (ref 0–0.9)
INR BLD: 1.03 RATIO — SIGNIFICANT CHANGE UP (ref 0.85–1.18)
LYMPHOCYTES # BLD AUTO: 3.96 K/UL — HIGH (ref 1–3.3)
LYMPHOCYTES # BLD AUTO: 39.4 % — SIGNIFICANT CHANGE UP (ref 13–44)
MCHC RBC-ENTMCNC: 29.7 PG — SIGNIFICANT CHANGE UP (ref 27–34)
MCHC RBC-ENTMCNC: 32.7 GM/DL — SIGNIFICANT CHANGE UP (ref 32–36)
MCV RBC AUTO: 90.9 FL — SIGNIFICANT CHANGE UP (ref 80–100)
MONOCYTES # BLD AUTO: 0.58 K/UL — SIGNIFICANT CHANGE UP (ref 0–0.9)
MONOCYTES NFR BLD AUTO: 5.8 % — SIGNIFICANT CHANGE UP (ref 2–14)
NEUTROPHILS # BLD AUTO: 4.91 K/UL — SIGNIFICANT CHANGE UP (ref 1.8–7.4)
NEUTROPHILS NFR BLD AUTO: 48.8 % — SIGNIFICANT CHANGE UP (ref 43–77)
NRBC # BLD: 0 /100 WBCS — SIGNIFICANT CHANGE UP (ref 0–0)
NRBC # FLD: 0 K/UL — SIGNIFICANT CHANGE UP (ref 0–0)
PLATELET # BLD AUTO: 269 K/UL — SIGNIFICANT CHANGE UP (ref 150–400)
POTASSIUM SERPL-MCNC: 3.8 MMOL/L — SIGNIFICANT CHANGE UP (ref 3.5–5.3)
POTASSIUM SERPL-SCNC: 3.8 MMOL/L — SIGNIFICANT CHANGE UP (ref 3.5–5.3)
PROTHROM AB SERPL-ACNC: 11.6 SEC — SIGNIFICANT CHANGE UP (ref 9.5–13)
RBC # BLD: 4.17 M/UL — SIGNIFICANT CHANGE UP (ref 3.8–5.2)
RBC # FLD: 13.8 % — SIGNIFICANT CHANGE UP (ref 10.3–14.5)
RH IG SCN BLD-IMP: POSITIVE — SIGNIFICANT CHANGE UP
SODIUM SERPL-SCNC: 138 MMOL/L — SIGNIFICANT CHANGE UP (ref 135–145)
WBC # BLD: 10.05 K/UL — SIGNIFICANT CHANGE UP (ref 3.8–10.5)
WBC # FLD AUTO: 10.05 K/UL — SIGNIFICANT CHANGE UP (ref 3.8–10.5)

## 2024-05-10 PROCEDURE — 95720 EEG PHY/QHP EA INCR W/VEEG: CPT | Mod: 1L

## 2024-05-10 PROCEDURE — 99232 SBSQ HOSP IP/OBS MODERATE 35: CPT

## 2024-05-10 RX ADMIN — CHLORHEXIDINE GLUCONATE 1 APPLICATION(S): 213 SOLUTION TOPICAL at 05:15

## 2024-05-10 NOTE — PROGRESS NOTE PEDS - SUBJECTIVE AND OBJECTIVE BOX
SUBJECTIVE EVENTS: Doing well     Vital Signs Last 24 Hrs  T(C): 36.8 (10 May 2024 05:07), Max: 37.2 (09 May 2024 17:03)  T(F): 98.2 (10 May 2024 05:07), Max: 98.9 (09 May 2024 17:03)  HR: 93 (10 May 2024 05:07) (87 - 136)  BP: 101/73 (10 May 2024 05:07) (101/73 - 121/79)  BP(mean): 84 (10 May 2024 05:07) (82 - 92)  RR: 16 (10 May 2024 05:07) (16 - 20)  SpO2: 100% (10 May 2024 05:07) (98% - 100%)    Parameters below as of 09 May 2024 20:00  Patient On (Oxygen Delivery Method): room air          PHYSICAL EXAM:  Awake Alert Age Appopriate  PERRL, EOMI, No facial droop, Tongue midline  Normal Tone 5/5 strength equally        DIET:      MEDICATIONS  (STANDING):  chlorhexidine 2% Topical Cloths - Peds 1 Application(s) Topical <User Schedule>  dextrose 5% + sodium chloride 0.9% with potassium chloride 20 mEq/L. - Pediatric 1000 milliLiter(s) (86 mL/Hr) IV Continuous <Continuous>  eslicarbazepine Oral Tab/Cap - Peds 1600 milliGRAM(s) Oral at bedtime    MEDICATIONS  (PRN):  acetaminophen   IV Intermittent - Peds. 675 milliGRAM(s) IV Intermittent once PRN Moderate Pain (4 -  6)  LORazepam IV Push - Peds 4 milliGRAM(s) IV Push once PRN seizure  oxyCODONE   IR Oral Tab/Cap - Peds 5 milliGRAM(s) Oral once PRN Severe Pain (7 - 10)  senna Oral Liquid - Peds 10 milliLiter(s) Oral at bedtime PRN Constipation                            12.4   10.05 )-----------( 269      ( 10 May 2024 02:16 )             37.9   05-10    138  |  105  |  8   ----------------------------<  106<H>  3.8   |  20<L>  |  0.47<L>    Ca    8.6      10 May 2024 02:16    PT/INR - ( 10 May 2024 02:16 )   PT: 11.6 sec;   INR: 1.03 ratio         PTT - ( 10 May 2024 02:16 )  PTT:25.0 secUrinalysis Basic - ( 10 May 2024 02:16 )    Color: x / Appearance: x / SG: x / pH: x  Gluc: 106 mg/dL / Ketone: x  / Bili: x / Urobili: x   Blood: x / Protein: x / Nitrite: x   Leuk Esterase: x / RBC: x / WBC x   Sq Epi: x / Non Sq Epi: x / Bacteria: x          RADIOLGY:   < from: CT Head No Cont in OR (05.06.24 @ 13:00) >  IMPRESSION:    There has been interval placement of multiple left-sided stereotactic   intracranial EEG leads, with tips in the left frontal, parietal, and   temporal locations.    < end of copied text >

## 2024-05-10 NOTE — DISCHARGE NOTE NURSING/CASE MANAGEMENT/SOCIAL WORK - PATIENT PORTAL LINK FT
You can access the FollowMyHealth Patient Portal offered by [facility] by registering at the following website: [email or web address]. By joining [employer]’s FollowMyHealth portal, you will also be able to view your health information using other applications (apps) compatible with our system.

## 2024-05-10 NOTE — BRIEF OPERATIVE NOTE - NSICDXBRIEFPROCEDURE_GEN_ALL_CORE_FT
PROCEDURES:  Stereotactic implantation of depth electrodes 06-May-2024 10:28:21  Luiza Clifford  
PROCEDURES:  Removal of SEEG electrode leads 10-May-2024 08:42:23  Luiza Clifford

## 2024-05-10 NOTE — BRIEF OPERATIVE NOTE - NSICDXBRIEFPREOP_GEN_ALL_CORE_FT
PRE-OP DIAGNOSIS:  Epilepsy 06-May-2024 10:28:30  Luiza Clifford  
PRE-OP DIAGNOSIS:  Epilepsy 06-May-2024 10:28:30  Luiza Clifford

## 2024-05-10 NOTE — PROGRESS NOTE PEDS - SUBJECTIVE AND OBJECTIVE BOX
Interval/Overnight Events: electrodes removed  _________________________________________________________________  Respiratory:  RA  _________________________________________________________________  Cardiac:  Cardiac Rhythm: Sinus rhythm  ________________________________________________________________  Fluids/Electrolytes/Nutrition:  I&O's Summary    09 May 2024 07:01  -  10 May 2024 07:00  --------------------------------------------------------  IN: 948 mL / OUT: 1675 mL / NET: -727 mL    Diet: NOPO  dextrose 5% + sodium chloride 0.9% with potassium chloride 20 mEq/L. - Pediatric 1000 milliLiter(s) IV Continuous <Continuous>  senna Oral Liquid - Peds 10 milliLiter(s) Oral at bedtime PRN    _________________________________________________________________  Neurologic:  Adequacy of sedation and pain control has been assessed and adjusted  acetaminophen   IV Intermittent - Peds. 675 milliGRAM(s) IV Intermittent once PRN  eslicarbazepine Oral Tab/Cap - Peds 1600 milliGRAM(s) Oral at bedtime  LORazepam IV Push - Peds 4 milliGRAM(s) IV Push once PRN  oxyCODONE   IR Oral Tab/Cap - Peds 5 milliGRAM(s) Oral once PRN    ________________________________________________________________  Additional Meds:  chlorhexidine 2% Topical Cloths - Peds 1 Application(s) Topical <User Schedule>    ________________________________________________________________  Access: See plan  Necessity of urinary, arterial, and venous catheters discussed  ________________________________________________________________  Labs:                                            12.4                  Neurophils% (auto):   48.8   (05-10 @ 02:16):    10.05)-----------(269          Lymphocytes% (auto):  39.4                                          37.9                   Eosinphils% (auto):   5.2      Manual%: Neutrophils x    ; Lymphocytes x    ; Eosinophils x    ; Bands%: x    ; Blasts x                                  138    |  105    |  8                   Calcium: 8.6   / iCa: x      (05-10 @ 02:16)    ----------------------------<  106       Magnesium: x                                3.8     |  20     |  0.47             Phosphorous: x        ( 05-10 @ 02:16 )   PT: 11.6 sec;   INR: 1.03 ratio  aPTT: 25.0 sec  _________________________________________________________________  PE:  T(C): 36.5 (05-10-24 @ 09:00), Max: 37.2 (05-09-24 @ 17:03)  HR: 73 (05-10-24 @ 09:30) (73 - 136)  BP: 110/70 (05-10-24 @ 09:30) (101/73 - 121/79)  RR: 16 (05-10-24 @ 09:30) (15 - 19)  SpO2: 100% (05-10-24 @ 09:30) (92% - 100%)  Weight (kg): 45.5  General:	No distress  Respiratory:      Effort even and unlabored. Clear bilaterally.   CV:                   Regular rate and rhythm. Normal S1/S2. No murmurs, rubs, or   .                       gallop. Capillary refill < 2 seconds. Distal pulses 2+ and equal.  Abdomen:	Soft, non-distended.  Skin:		No rashes.  Extremities:	Warm and well perfused.   Neurologic:	Alert.  No acute change from baseline exam.  ________________________________________________________________  Patient and Parent/Guardian was updated as to the progress/plan of care.  The patient is improving but requires continued monitoring and adjustment of therapy.   Interval/Overnight Events: electrodes removed  _________________________________________________________________  Respiratory:  RA  _________________________________________________________________  Cardiac:  Cardiac Rhythm: Sinus rhythm  ________________________________________________________________  Fluids/Electrolytes/Nutrition:  I&O's Summary    09 May 2024 07:01  -  10 May 2024 07:00  --------------------------------------------------------  IN: 948 mL / OUT: 1675 mL / NET: -727 mL    Diet: NOPO  dextrose 5% + sodium chloride 0.9% with potassium chloride 20 mEq/L. - Pediatric 1000 milliLiter(s) IV Continuous <Continuous>  senna Oral Liquid - Peds 10 milliLiter(s) Oral at bedtime PRN    _________________________________________________________________  Neurologic:  Adequacy of sedation and pain control has been assessed and adjusted  acetaminophen   IV Intermittent - Peds. 675 milliGRAM(s) IV Intermittent once PRN  eslicarbazepine Oral Tab/Cap - Peds 1600 milliGRAM(s) Oral at bedtime  LORazepam IV Push - Peds 4 milliGRAM(s) IV Push once PRN  oxyCODONE   IR Oral Tab/Cap - Peds 5 milliGRAM(s) Oral once PRN    ________________________________________________________________  Additional Meds:  chlorhexidine 2% Topical Cloths - Peds 1 Application(s) Topical <User Schedule>    ________________________________________________________________  Access: See plan  Necessity of urinary, arterial, and venous catheters discussed  ________________________________________________________________  Labs:                                            12.4                  Neurophils% (auto):   48.8   (05-10 @ 02:16):    10.05)-----------(269          Lymphocytes% (auto):  39.4                                          37.9                   Eosinphils% (auto):   5.2      Manual%: Neutrophils x    ; Lymphocytes x    ; Eosinophils x    ; Bands%: x    ; Blasts x                                  138    |  105    |  8                   Calcium: 8.6   / iCa: x      (05-10 @ 02:16)    ----------------------------<  106       Magnesium: x                                3.8     |  20     |  0.47             Phosphorous: x        ( 05-10 @ 02:16 )   PT: 11.6 sec;   INR: 1.03 ratio  aPTT: 25.0 sec  _________________________________________________________________  PE:  T(C): 36.5 (05-10-24 @ 09:00), Max: 37.2 (05-09-24 @ 17:03)  HR: 73 (05-10-24 @ 09:30) (73 - 136)  BP: 110/70 (05-10-24 @ 09:30) (101/73 - 121/79)  RR: 16 (05-10-24 @ 09:30) (15 - 19)  SpO2: 100% (05-10-24 @ 09:30) (92% - 100%)  Weight (kg): 45.5  General:	No distress  Respiratory:      Effort even and unlabored. Clear bilaterally.   CV:                   Regular rate and rhythm. Normal S1/S2. No murmurs, rubs, or   .                       gallop. Capillary refill < 2 seconds. Distal pulses 2+ and equal.  Abdomen:	Soft, non-distended.  Skin:		No rashes.  Extremities:	Warm and well perfused.   Neurologic:	Alert.  No acute change from baseline exam.  ________________________________________________________________  Patient and Parent/Guardian was updated as to the progress/plan of care.  services provided for 10 minutes.  The patient is improving but requires continued monitoring and adjustment of therapy.

## 2024-05-10 NOTE — BRIEF OPERATIVE NOTE - NSICDXBRIEFPOSTOP_GEN_ALL_CORE_FT
POST-OP DIAGNOSIS:  Epilepsy 06-May-2024 10:28:40  Luiza Clifford  
POST-OP DIAGNOSIS:  Epilepsy 06-May-2024 10:28:40  Luiza Clifford

## 2024-05-10 NOTE — EEG REPORT - NS EEG TEXT BOX
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     Medication:   ESL 1600mg qHS    Home Regimen: ESL 1600mg qHS    Recording Technique:  The patient underwent continuous Video-EEG monitoring, using Telemetry System hardware on the XLTek Digital System.  EEG and video data were stored on a computer hard drive with important events saved in digital archive files. The material was reviewed by a physician (electroencephalographer / epileptologist) on a daily basis.  Cole and seizure detection algorithms were utilized and reviewed.  An EEG Technician attended to the patient for 8 to 10 hours per day. The patient was observed by the epilepsy nursing staff 24-hours per day. The epilepsy center neurologist was available in person or on call 24-hours per day.    Placement and Labeling of Electrodes:  1. Pars Marginalis Anterior (PMA)- 10  2. Middle Temporal Amygdala (MTA)- 12  3. Middle Temporal Hippocampal Head (MTHH)- 12  4. Middle Temporal Hippocampal Body (MTHB)- 12  5. Posterior Temporal (PT)- 10  6. Anterior Parietal (APar)- 14  7. Middle Frontal Anterior Cingulate (MFAC)- 12  8. Middle Frontal Middle Cingulate (MFMC)- 12  9. Middle Frontal Posterior Cingulate (MFPC)- 14  10. Pars Marginalis Posterior (PMPo)- 8  11. Pars Marginalis Superior (PMSu)- 8  12. Pars Marginalis Inferior (PMIn)- 10  13. Superior Insula (SI)- 8  14. Inferior Insula (II)- 10  EKG 2  Total Leads: 154    The EEG was performed utilizing intracranial electrodes. Recording was at a sampling rate of 500-1000 samples per second per channel.  Time synchronized digital video recording was done simultaneously with EEG recording.  A low light infrared camera was used for low light recording.  Cole and seizure detection algorithms were utilized and additionally reviewed.    The montage for the intracranial electrodes was as the following:  Page 1: ALL  Page 2: Temporal 2, 3, 4, 5  Page 3: Cinguloparietal 6, 7, 8, 9  Page 4: Pars Marginalis 1, 10, 11, 12  Page 5: Insula 13, 14    Date & time:   Start: 05-09-24 08:00  End: 05-10-24 07:33    TECHNICAL LIMITATIONS:   [facility]     FINDINGS: The background consisted of mostly mixed alpha, beta, theta frequencies of sinusoidal appearance.    Interictal abnormalities: Abundant 2.5-3Hz periodic spike polyspike discharges over the medial contacts of the pars marginalis (PMA2-5, PMSu2-4, PMPo3-5, PMIn 2-4).   Ictal abnormalities: None    EEG Summary/Classification:  Abnormal/Normal intracranial EEG due to :         Interictal abnormalities: Abundant 2.5-3Hz periodic spike polyspike discharges over the medial contacts of the pars marginalis (PMA2-5, PMSu2-4, PMPo3-5, PMIn 2-4).          Ictal abnormalities: None.    Impression/Clinical Correlate: The finding of this stereotactic EEG indicates cortical irritability in the medial contacts of the left pars marginalis in the area of known focal cortical dysplasia.     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     Medication:   ESL 1600mg qHS    Home Regimen: ESL 1600mg qHS    Recording Technique:  The patient underwent continuous Video-EEG monitoring, using Telemetry System hardware on the XLTek Digital System.  EEG and video data were stored on a computer hard drive with important events saved in digital archive files. The material was reviewed by a physician (electroencephalographer / epileptologist) on a daily basis.  Cole and seizure detection algorithms were utilized and reviewed.  An EEG Technician attended to the patient for 8 to 10 hours per day. The patient was observed by the epilepsy nursing staff 24-hours per day. The epilepsy center neurologist was available in person or on call 24-hours per day.    Placement and Labeling of Electrodes:  1. Pars Marginalis Anterior (PMA)- 10  2. Middle Temporal Amygdala (MTA)- 12  3. Middle Temporal Hippocampal Head (MTHH)- 12  4. Middle Temporal Hippocampal Body (MTHB)- 12  5. Posterior Temporal (PT)- 10  6. Anterior Parietal (APar)- 14  7. Middle Frontal Anterior Cingulate (MFAC)- 12  8. Middle Frontal Middle Cingulate (MFMC)- 12  9. Middle Frontal Posterior Cingulate (MFPC)- 14  10. Pars Marginalis Posterior (PMPo)- 8  11. Pars Marginalis Superior (PMSu)- 8  12. Pars Marginalis Inferior (PMIn)- 10  13. Superior Insula (SI)- 8  14. Inferior Insula (II)- 10  EKG 2  Total Leads: 154    The EEG was performed utilizing intracranial electrodes. Recording was at a sampling rate of 500-1000 samples per second per channel.  Time synchronized digital video recording was done simultaneously with EEG recording.  A low light infrared camera was used for low light recording.  Cole and seizure detection algorithms were utilized and additionally reviewed.    The montage for the intracranial electrodes was as the following:  Page 1: ALL  Page 2: Temporal 2, 3, 4, 5  Page 3: Cinguloparietal 6, 7, 8, 9  Page 4: Pars Marginalis 1, 10, 11, 12  Page 5: Insula 13, 14    Date & time:   Start: 05-09-24 08:00  End: 05-10-24 07:33    TECHNICAL LIMITATIONS:   [facility]     FINDINGS: The background consisted of mostly mixed alpha, beta, theta frequencies of sinusoidal appearance.    Interictal abnormalities: Abundant 2.5-3Hz periodic spike polyspike discharges over the medial contacts of the pars marginalis (PMA2-5, PMSu2-4, PMPo3-5, PMIn 2-4).   Ictal abnormalities: None    EEG Summary/Classification:  Abnormal/Normal intracranial EEG due to :         Interictal abnormalities: Abundant 2.5-3Hz periodic spike polyspike discharges over the medial contacts of the pars marginalis (PMA2-5, PMSu2-4, PMPo3-5, PMIn 2-4).          Ictal abnormalities: None.    Impression/Clinical Correlate: The finding of this stereotactic EEG indicates cortical irritability in the medial contacts of the left pars marginalis in the area of known focal cortical dysplasia.       Salinas Kelly MD  PGY-6, Pediatric Epilepsy Fellow     Jerry West MD  Attending Physician   Pediatric Neurology/Epilepsy

## 2024-05-10 NOTE — PROGRESS NOTE PEDS - ASSESSMENT
19 year old F with epilepsy Admitted for elective SEEG placement        5/6 OR for SEEG placement  5/7 Off seizure meds  5/8 off seizure meds, no seizure activity yet  5/9 2 seizures overnight  5/10 OR for SEEG removal

## 2024-05-10 NOTE — PROGRESS NOTE PEDS - PROBLEM SELECTOR PLAN 1
- continue holding AED  - tentative SEEG removal tomorrow if seizures captured per neurology  - NPO after midnight with IVF    [id number]    Case discussed with attending neurosurgeon Dr. Mann
- OR for removal or SEEG  - DC planning later today  - Restarted AED    [id number]    Case discussed with attending neurosurgeon Dr. Mann
- AED wean per neurology  - Tentative plan for SEEG lead removal of friday if enough seizures are captured  - D/w Dr. Miguel  - Regular diet
- continue holding AED  - tentative SEEG removal Friday if seizures captured    [id number]    Case discussed with attending neurosurgeon Dr. Mann

## 2024-05-10 NOTE — PROGRESS NOTE PEDS - PROVIDER SPECIALTY LIST PEDS
Critical Care
Neurology
Neurology
Neurosurgery
Neurosurgery
Neurology
Neurosurgery

## 2024-05-10 NOTE — PROGRESS NOTE PEDS - ASSESSMENT
19yoF with focal epilepsy admitted after JAMILA guided stereotactic EEG placement, leads removed this morning. Will observe this morning and likely discharge home this afternoon on her prior AED regimen and neurology and neurosurgery follow up.

## 2024-06-13 NOTE — HISTORY OF PRESENT ILLNESS
What Type Of Note Output Would You Prefer (Optional)?: Standard Output What Is The Reason For Today's Visit?: Annual Full Body Skin Examination with No Concerns What Is The Reason For Today's Visit? (Being Monitored For X): concerning skin lesions on an annual basis How Severe Are Your Spot(S)?: mild [FreeTextEntry1] : Estela is a 16 year old female here for follow up evaluation of focal epilepsy\par \par Sommer was admitted to [facility] from 5/27- 5/30/21.\par \par Med3 Course (5/27-) \par Pt arrived to floor in stable condition. Pt was placed on vEEG which showed findings suggestive of frontal lobe seizures. Pt was started on Tripletal but continued to have episodes. Trileptal dose was increased. On certain physical exams, pt was noted to have extroversion of the eyes bilaterally on upward gaze. MRI of head was done and showed: "Nonspecific subtle cortical T2/FLAIR prolongation about the left pars marginalis compatible with vasogenic edema, which may represent transient seizure related change, amongst other etiologies. \par The patient should return for postcontrast MR imaging. No brain structural abnormality identified." Patient stable for discharge home with outpatient neurology follow up. \par \par Per neurology, patient is going home on Trileptal 300mg in the morning and 450mg in the evening for the next 3 days, then will increase to 450mg BID for the next week, then if seizures persist can increase to 450mg in the morning and 600mg at bedtime for 1 week, if seizures persist can increase to 600mg BID weekly. Please continue on Folate 1mg daily, Vitamin D 1000mg daily, and 15mg rectal Diastat as needed for seizures lasting greater than 3-5 minutes. \par \par She is currently in [facility] 450mg BID ( 20mg/kg/day).  She has remained seizure free since 6/3.  No significant side effects.  Does note some sleepiness after taking AM dose but has been able to go to school without daytime sleepiness.  \par \par Sommer has his drivers permit but has not been driving since diagnosis. \par \par   \par Initial visit: \par Sommer was seen in [facility] 5/24/21 for concerns of  episodes of R shoulder tingling, followed by falling. Pt had first\par episode when she was approximately 9 year old but has never been witnessed by family members or mentioned to family members as it happened so infrequently and she "didn’t think it was a big deal".  Sommer reports prior to last week she has had about 10 episodes in her life time.  All episodes start with tingling and numbness of her R shoulder for several seconds, followed by her whole right side of body going limp.  She sometimes falls to the ground when this happens and feels like she can't get up but then in several more seconds she is back to normal. She states that she is aware for the entire duration of the episode but  feels it is difficult to respond as she is "focusing on rebalancing" herself. She had an episode on 5/23 as well as a week prior.  Since 5/23 she has continued to have daily episodes. There is no pattern of trigger to the episodes but tend to occur more often when walking rather than sitting, This is the shortest interval between episodes.  The recent episodes were witnessed by family which prompted evaluation.  \par \par Sister reports recent episode where she and Evalin were walking when Sommer screamed "it's happening again".  Sister notes she right arm turned in, face turned to the left and she fell to the ground.  There was no loss on consciousness but she was unable to speck initially. After a few moments she is able to stand up and is back to baseline. \par \par Mother notes one time last year upon waking her in the AM, she heard Sommer fall.  Mother was not in room when this occurred so unclear if there was convulsion or other clinical findings.  Sommer does not recall this event.  \par \par She is currently in 11th grade- academically doing well. \par \par Sleeps well. \par \par No family history of seizures. Mother notes maternal uncle had an episode where he couldn't walk temporarily but mother is unsure what caused this \par \par

## 2024-06-15 PROBLEM — G40.909 EPILEPSY, UNSPECIFIED, NOT INTRACTABLE, WITHOUT STATUS EPILEPTICUS: Chronic | Status: ACTIVE | Noted: 2024-05-01

## 2024-06-18 ENCOUNTER — OUTPATIENT (OUTPATIENT)
Dept: OUTPATIENT SERVICES | Age: 19
LOS: 1 days | End: 2024-06-18

## 2024-06-18 VITALS
WEIGHT: 101.41 LBS | RESPIRATION RATE: 16 BRPM | SYSTOLIC BLOOD PRESSURE: 104 MMHG | OXYGEN SATURATION: 98 % | HEART RATE: 84 BPM | HEIGHT: 62.68 IN | DIASTOLIC BLOOD PRESSURE: 67 MMHG | TEMPERATURE: 98 F

## 2024-06-18 DIAGNOSIS — Z60.3 ACCULTURATION DIFFICULTY: ICD-10-CM

## 2024-06-18 DIAGNOSIS — G40.919 EPILEPSY, UNSPECIFIED, INTRACTABLE, WITHOUT STATUS EPILEPTICUS: ICD-10-CM

## 2024-06-18 DIAGNOSIS — Z98.890 OTHER SPECIFIED POSTPROCEDURAL STATES: Chronic | ICD-10-CM

## 2024-06-18 DIAGNOSIS — Z92.89 PERSONAL HISTORY OF OTHER MEDICAL TREATMENT: Chronic | ICD-10-CM

## 2024-06-18 DIAGNOSIS — G40.909 EPILEPSY, UNSPECIFIED, NOT INTRACTABLE, WITHOUT STATUS EPILEPTICUS: ICD-10-CM

## 2024-06-18 LAB
ANION GAP SERPL CALC-SCNC: 11 MMOL/L — SIGNIFICANT CHANGE UP (ref 7–14)
BLD GP AB SCN SERPL QL: NEGATIVE — SIGNIFICANT CHANGE UP
BUN SERPL-MCNC: 13 MG/DL — SIGNIFICANT CHANGE UP (ref 7–23)
CALCIUM SERPL-MCNC: 9.1 MG/DL — SIGNIFICANT CHANGE UP (ref 8.4–10.5)
CHLORIDE SERPL-SCNC: 103 MMOL/L — SIGNIFICANT CHANGE UP (ref 98–107)
CO2 SERPL-SCNC: 22 MMOL/L — SIGNIFICANT CHANGE UP (ref 22–31)
CREAT SERPL-MCNC: 0.54 MG/DL — SIGNIFICANT CHANGE UP (ref 0.5–1.3)
EGFR: 135 ML/MIN/1.73M2 — SIGNIFICANT CHANGE UP
GLUCOSE SERPL-MCNC: 84 MG/DL — SIGNIFICANT CHANGE UP (ref 70–99)
HCG SERPL-ACNC: <1 MIU/ML — SIGNIFICANT CHANGE UP
HCT VFR BLD CALC: 37.3 % — SIGNIFICANT CHANGE UP (ref 34.5–45)
HGB BLD-MCNC: 12.6 G/DL — SIGNIFICANT CHANGE UP (ref 11.5–15.5)
MCHC RBC-ENTMCNC: 30.1 PG — SIGNIFICANT CHANGE UP (ref 27–34)
MCHC RBC-ENTMCNC: 33.8 GM/DL — SIGNIFICANT CHANGE UP (ref 32–36)
MCV RBC AUTO: 89 FL — SIGNIFICANT CHANGE UP (ref 80–100)
NRBC # BLD: 0 /100 WBCS — SIGNIFICANT CHANGE UP (ref 0–0)
NRBC # FLD: 0 K/UL — SIGNIFICANT CHANGE UP (ref 0–0)
PLATELET # BLD AUTO: 246 K/UL — SIGNIFICANT CHANGE UP (ref 150–400)
POTASSIUM SERPL-MCNC: 4.6 MMOL/L — SIGNIFICANT CHANGE UP (ref 3.5–5.3)
POTASSIUM SERPL-SCNC: 4.6 MMOL/L — SIGNIFICANT CHANGE UP (ref 3.5–5.3)
RBC # BLD: 4.19 M/UL — SIGNIFICANT CHANGE UP (ref 3.8–5.2)
RBC # FLD: 13.4 % — SIGNIFICANT CHANGE UP (ref 10.3–14.5)
RH IG SCN BLD-IMP: POSITIVE — SIGNIFICANT CHANGE UP
SODIUM SERPL-SCNC: 136 MMOL/L — SIGNIFICANT CHANGE UP (ref 135–145)
WBC # BLD: 7.64 K/UL — SIGNIFICANT CHANGE UP (ref 3.8–10.5)
WBC # FLD AUTO: 7.64 K/UL — SIGNIFICANT CHANGE UP (ref 3.8–10.5)

## 2024-06-18 RX ORDER — FOLIC ACID 0.8 MG
0 TABLET ORAL
Qty: 0 | Refills: 3 | DISCHARGE

## 2024-06-18 RX ORDER — MIDAZOLAM HYDROCHLORIDE 1 MG/ML
0 INJECTION, SOLUTION INTRAMUSCULAR; INTRAVENOUS
Qty: 0 | Refills: 0 | DISCHARGE

## 2024-06-18 RX ORDER — ERGOCALCIFEROL 1.25 MG/1
0 CAPSULE ORAL
Qty: 0 | Refills: 0 | DISCHARGE

## 2024-06-18 SDOH — SOCIAL STABILITY - SOCIAL INSECURITY: ACCULTURATION DIFFICULTY: Z60.3

## 2024-06-20 ENCOUNTER — TRANSCRIPTION ENCOUNTER (OUTPATIENT)
Age: 19
End: 2024-06-20

## 2024-06-21 ENCOUNTER — APPOINTMENT (OUTPATIENT)
Dept: MRI IMAGING | Facility: HOSPITAL | Age: 19
End: 2024-06-21

## 2024-06-21 ENCOUNTER — INPATIENT (INPATIENT)
Age: 19
LOS: 0 days | Discharge: HOME | End: 2024-06-22
Attending: NEUROLOGICAL SURGERY | Admitting: NEUROLOGICAL SURGERY
Payer: MEDICAID

## 2024-06-21 VITALS
HEIGHT: 62.68 IN | SYSTOLIC BLOOD PRESSURE: 106 MMHG | WEIGHT: 101.41 LBS | RESPIRATION RATE: 18 BRPM | HEART RATE: 83 BPM | DIASTOLIC BLOOD PRESSURE: 61 MMHG | OXYGEN SATURATION: 100 % | TEMPERATURE: 98 F

## 2024-06-21 DIAGNOSIS — G40.919 EPILEPSY, UNSPECIFIED, INTRACTABLE, WITHOUT STATUS EPILEPTICUS: ICD-10-CM

## 2024-06-21 DIAGNOSIS — Z92.89 PERSONAL HISTORY OF OTHER MEDICAL TREATMENT: Chronic | ICD-10-CM

## 2024-06-21 DIAGNOSIS — Z98.890 OTHER SPECIFIED POSTPROCEDURAL STATES: Chronic | ICD-10-CM

## 2024-06-21 DIAGNOSIS — Z48.811 ENCOUNTER FOR SURGICAL AFTERCARE FOLLOWING SURGERY ON THE NERVOUS SYSTEM: ICD-10-CM

## 2024-06-21 LAB — HCG UR QL: NEGATIVE — SIGNIFICANT CHANGE UP

## 2024-06-21 PROCEDURE — 70552 MRI BRAIN STEM W/DYE: CPT | Mod: 26

## 2024-06-21 PROCEDURE — 70496 CT ANGIOGRAPHY HEAD: CPT | Mod: 26

## 2024-06-21 PROCEDURE — 99291 CRITICAL CARE FIRST HOUR: CPT

## 2024-06-21 PROCEDURE — 61737 LITT ICR MLT TRJ MLT/CPLX LS: CPT | Mod: 80

## 2024-06-21 DEVICE — IMPLANTABLE DEVICE: Type: IMPLANTABLE DEVICE | Status: FUNCTIONAL

## 2024-06-21 DEVICE — BONE WAX 2.5GM: Type: IMPLANTABLE DEVICE | Status: FUNCTIONAL

## 2024-06-21 RX ORDER — FOLIC ACID 1 MG/1
1 TABLET ORAL DAILY
Refills: 0 | Status: DISCONTINUED | OUTPATIENT
Start: 2024-06-21 | End: 2024-06-22

## 2024-06-21 RX ORDER — DEXAMETHASONE 0.5 MG/1
1 TABLET ORAL EVERY 12 HOURS
Refills: 0 | Status: CANCELLED | OUTPATIENT
Start: 2024-07-01 | End: 2024-06-22

## 2024-06-21 RX ORDER — DEXAMETHASONE 0.5 MG/1
1 TABLET ORAL EVERY 24 HOURS
Refills: 0 | Status: CANCELLED | OUTPATIENT
Start: 2024-07-04 | End: 2024-06-22

## 2024-06-21 RX ORDER — DEXAMETHASONE 0.5 MG/1
2 TABLET ORAL EVERY 12 HOURS
Refills: 0 | Status: CANCELLED | OUTPATIENT
Start: 2024-06-29 | End: 2024-06-22

## 2024-06-21 RX ORDER — DEXAMETHASONE 0.5 MG/1
2 TABLET ORAL EVERY 8 HOURS
Refills: 0 | Status: CANCELLED | OUTPATIENT
Start: 2024-06-27 | End: 2024-06-22

## 2024-06-21 RX ORDER — DEXAMETHASONE 0.5 MG/1
10 TABLET ORAL EVERY 6 HOURS
Refills: 0 | Status: COMPLETED | OUTPATIENT
Start: 2024-06-21 | End: 2024-06-22

## 2024-06-21 RX ORDER — ESLICARBAZEPINE ACETATE 200 MG/1
1600 TABLET ORAL DAILY
Refills: 0 | Status: DISCONTINUED | OUTPATIENT
Start: 2024-06-21 | End: 2024-06-22

## 2024-06-21 RX ORDER — ESLICARBAZEPINE ACETATE 200 MG/1
800 TABLET ORAL
Refills: 0 | Status: DISCONTINUED | OUTPATIENT
Start: 2024-06-21 | End: 2024-06-21

## 2024-06-21 RX ORDER — DEXAMETHASONE 0.5 MG/1
4 TABLET ORAL EVERY 6 HOURS
Refills: 0 | Status: DISCONTINUED | OUTPATIENT
Start: 2024-06-23 | End: 2024-06-22

## 2024-06-21 RX ORDER — DEXAMETHASONE 0.5 MG/1
TABLET ORAL
Refills: 0 | Status: DISCONTINUED | OUTPATIENT
Start: 2024-06-21 | End: 2024-06-22

## 2024-06-21 RX ORDER — DEXAMETHASONE 0.5 MG/1
6 TABLET ORAL EVERY 6 HOURS
Refills: 0 | Status: DISCONTINUED | OUTPATIENT
Start: 2024-06-22 | End: 2024-06-22

## 2024-06-21 RX ORDER — CEFAZOLIN SODIUM IN 0.9 % NACL 3 G/100 ML
1380 INTRAVENOUS SOLUTION, PIGGYBACK (ML) INTRAVENOUS EVERY 8 HOURS
Refills: 0 | Status: COMPLETED | OUTPATIENT
Start: 2024-06-21 | End: 2024-06-22

## 2024-06-21 RX ORDER — DEXAMETHASONE 0.5 MG/1
4 TABLET ORAL EVERY 8 HOURS
Refills: 0 | Status: CANCELLED | OUTPATIENT
Start: 2024-06-25 | End: 2024-06-22

## 2024-06-21 RX ORDER — ACETAMINOPHEN 500 MG/5ML
650 LIQUID (ML) ORAL EVERY 6 HOURS
Refills: 0 | Status: DISCONTINUED | OUTPATIENT
Start: 2024-06-21 | End: 2024-06-22

## 2024-06-21 RX ADMIN — DEXAMETHASONE 10 MILLIGRAM(S): 0.5 TABLET ORAL at 22:18

## 2024-06-21 RX ADMIN — ESLICARBAZEPINE ACETATE 1600 MILLIGRAM(S): 200 TABLET ORAL at 21:02

## 2024-06-21 RX ADMIN — Medication 138 MILLIGRAM(S): at 16:11

## 2024-06-21 RX ADMIN — DEXAMETHASONE 10 MILLIGRAM(S): 0.5 TABLET ORAL at 16:10

## 2024-06-22 ENCOUNTER — TRANSCRIPTION ENCOUNTER (OUTPATIENT)
Age: 19
End: 2024-06-22

## 2024-06-22 VITALS
SYSTOLIC BLOOD PRESSURE: 99 MMHG | OXYGEN SATURATION: 100 % | RESPIRATION RATE: 18 BRPM | TEMPERATURE: 98 F | DIASTOLIC BLOOD PRESSURE: 66 MMHG | HEART RATE: 93 BPM

## 2024-06-22 PROCEDURE — 99291 CRITICAL CARE FIRST HOUR: CPT

## 2024-06-22 RX ORDER — DEXAMETHASONE 0.5 MG/1
2 TABLET ORAL
Qty: 47 | Refills: 0
Start: 2024-06-22 | End: 2024-07-03

## 2024-06-22 RX ORDER — ACETAMINOPHEN 500 MG/5ML
2 LIQUID (ML) ORAL
Qty: 0 | Refills: 0 | DISCHARGE
Start: 2024-06-22

## 2024-06-22 RX ADMIN — DEXAMETHASONE 10 MILLIGRAM(S): 0.5 TABLET ORAL at 10:13

## 2024-06-22 RX ADMIN — Medication 138 MILLIGRAM(S): at 00:08

## 2024-06-22 RX ADMIN — Medication 400 UNIT(S): at 10:13

## 2024-06-22 RX ADMIN — FOLIC ACID 1 MILLIGRAM(S): 1 TABLET ORAL at 10:13

## 2024-06-22 RX ADMIN — DEXAMETHASONE 10 MILLIGRAM(S): 0.5 TABLET ORAL at 03:43

## 2024-08-06 ENCOUNTER — RX RENEWAL (OUTPATIENT)
Age: 19
End: 2024-08-06

## 2024-08-06 PROBLEM — Z60.3 ACCULTURATION DIFFICULTY: Chronic | Status: ACTIVE | Noted: 2024-06-18

## 2024-09-18 ENCOUNTER — APPOINTMENT (OUTPATIENT)
Dept: PEDIATRIC NEUROLOGY | Facility: CLINIC | Age: 19
End: 2024-09-18
Payer: MEDICAID

## 2024-09-18 VITALS
BODY MASS INDEX: 17.72 KG/M2 | SYSTOLIC BLOOD PRESSURE: 101 MMHG | WEIGHT: 100 LBS | DIASTOLIC BLOOD PRESSURE: 68 MMHG | HEIGHT: 63 IN | HEART RATE: 78 BPM

## 2024-09-18 DIAGNOSIS — G40.219 LOCALIZATION-RELATED (FOCAL) (PARTIAL) SYMPTOMATIC EPILEPSY AND EPILEPTIC SYNDROMES WITH COMPLEX PARTIAL SEIZURES, INTRACTABLE, W/OUT STATUS EPILEPTICUS: ICD-10-CM

## 2024-09-18 PROCEDURE — 99214 OFFICE O/P EST MOD 30 MIN: CPT

## 2024-09-18 RX ORDER — ESLICARBAZEPINE ACETATE 400 MG/1
400 TABLET ORAL
Qty: 30 | Refills: 5 | Status: ACTIVE | COMMUNITY
Start: 2024-09-18 | End: 1900-01-01

## 2024-09-19 NOTE — QUALITY MEASURES
[Seizure frequency] : Seizure frequency: Yes [Etiology, seizure type, and epilepsy syndrome] : Etiology, seizure type, and epilepsy syndrome: Yes [Side effects of anti-seizure medications] : Side effects of anti-seizure medications: Yes [Safety and education around seizures] : Safety and education around seizures: Yes [Sudden unexpected death in epilepsy (SUDEP)] : Sudden unexpected death in epilepsy: Yes [Issues around driving] : Issues around driving: Yes [Screening for anxiety, depression] : Screening for anxiety, depression: Yes [Treatment-resistant epilepsy (every visit)] : Treatment-resistant epilepsy (every visit): Yes [Adherence to medication(s)] : Adherence to medication(s): Yes [Counseling for women of childbearing potential with epilepsy (including folic acid supplement)] : Counseling for women of childbearing potential with epilepsy (including folic acid supplement): Yes [Options for adjunctive therapy (Neurostimulation, CBD, Dietary Therapy, Epilepsy Surgery)] : Options for adjunctive therapy (Neurostimulation, CBD, Dietary Therapy, Epilepsy Surgery): Yes [25 Hydroxy Vitamin D level assessed and Vitamin D3 ordered] : 25 Hydroxy Vitamin D level assessed and Vitamin D3 ordered: Yes [Thyroid profile ordered] : Thyroid profile ordered: Not Applicable

## 2024-09-19 NOTE — PHYSICAL EXAM
[Well-appearing] : well-appearing [No dysmorphic facial features] : no dysmorphic facial features [No abnormal neurocutaneous stigmata or skin lesions] : no abnormal neurocutaneous stigmata or skin lesions [Straight] : straight [No deformities] : no deformities [Alert] : alert [Normal speech and language] : normal speech and language [de-identified] : respirations appear regular and unlabored  [de-identified] : abdomen does not appear distended  [de-identified] : pupils are equal, round and reactive to light. Visual fields were intact to confrontation. Eye movements were full with no nystagmus. Funduscopic exam revealed sharp disc margins. Facial sensation intact to touch and/or temperature. Facial strength was normal. Hearing intact to soft finger rub and/or 128 Hz tuning fork. Palate elevated symmetrically with phonation. Cephalic version and/or shoulder shrug exhibited normal strength. Tongue protruded in the midline. [de-identified] : no pronator drift was noted. Normal resistance to passive manipulation was demonstrated. Muscle strength was normal both proximally and distally. [de-identified] : casual gait was narrow based. Heel and toe walking were intact. Tandem gait was intact  [de-identified] : 2+ and symmetrical at all tested locations. No ankle clonus. Plantar responses were flexor.  [de-identified] : normal response to touch at all tested locations. Romberg negative  [de-identified] : finger to nose and heel-knee-shin movements were intact. Fast finger movements were brisk, rhythmic and symmetric

## 2024-09-19 NOTE — HISTORY OF PRESENT ILLNESS
[FreeTextEntry1] : I have had the opportunity to see your patient, CHELO MICHELLE, in follow up.   Identification: 19 year old woman  Diagnosis(es): Pharmacoresistant focal epilepsy. Cortical dysplasia. S/P DUSTY  Interval history: Seizure free status post DUSTY. She feels that her motor coordination is poor. This seems to affect principally her fine motor skills rather than gross motor. This impairment does not seem to be lateralized. Sleeping well. Depressed mood and excessive anxiety were denied.   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 PET MRI from June demonstrated focal hypometabolism in the same area.

## 2024-09-19 NOTE — ASSESSMENT
[FreeTextEntry1] : It was my pleasure to have seen CHELO MICHELLE in consultation.   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 S/P DUSTY   Medical decision making: Seizure free since DUSTY. Perception of motor incoordination but no findings on exam to support dysfunction. Differential diagnostic considerations would include higher order motor planning deficit related to surgery, poor coordination due to high dose of eslicarbazepine.    Discussion: Seizure diagnosis, prognosis, recurrence risk, provocative factors, health risks, first aid and safety precautions were reviewed. Indications for ongoing pharmacological treatment of seizures, potential benefits of treatment and possible adverse effects of medication were carefully considered and discussed.    Recommendations: - [facility] referral for evaluation. - Lower dose of eslicarbazepine to 1200 mg.

## 2024-10-29 ENCOUNTER — TRANSCRIPTION ENCOUNTER (OUTPATIENT)
Age: 19
End: 2024-10-29

## 2024-12-23 ENCOUNTER — APPOINTMENT (OUTPATIENT)
Dept: PEDIATRIC NEUROLOGY | Facility: CLINIC | Age: 19
End: 2024-12-23

## 2024-12-23 ENCOUNTER — APPOINTMENT (OUTPATIENT)
Dept: PEDIATRIC NEUROLOGY | Facility: CLINIC | Age: 19
End: 2024-12-23
Payer: MEDICAID

## 2024-12-23 DIAGNOSIS — G40.219 LOCALIZATION-RELATED (FOCAL) (PARTIAL) SYMPTOMATIC EPILEPSY AND EPILEPTIC SYNDROMES WITH COMPLEX PARTIAL SEIZURES, INTRACTABLE, W/OUT STATUS EPILEPTICUS: ICD-10-CM

## 2024-12-23 PROCEDURE — 99214 OFFICE O/P EST MOD 30 MIN: CPT

## 2025-02-04 ENCOUNTER — TRANSCRIPTION ENCOUNTER (OUTPATIENT)
Age: 20
End: 2025-02-04

## 2025-04-26 ENCOUNTER — NON-APPOINTMENT (OUTPATIENT)
Age: 20
End: 2025-04-26

## 2025-05-02 ENCOUNTER — NON-APPOINTMENT (OUTPATIENT)
Age: 20
End: 2025-05-02

## 2025-06-12 ENCOUNTER — RX CHANGE (OUTPATIENT)
Age: 20
End: 2025-06-12

## 2025-07-15 ENCOUNTER — OUTPATIENT (OUTPATIENT)
Dept: OUTPATIENT SERVICES | Facility: HOSPITAL | Age: 20
LOS: 1 days | End: 2025-07-15
Payer: MEDICAID

## 2025-07-15 ENCOUNTER — APPOINTMENT (OUTPATIENT)
Dept: MRI IMAGING | Facility: CLINIC | Age: 20
End: 2025-07-15
Payer: MEDICAID

## 2025-07-15 DIAGNOSIS — Z92.89 PERSONAL HISTORY OF OTHER MEDICAL TREATMENT: Chronic | ICD-10-CM

## 2025-07-15 DIAGNOSIS — Z98.890 OTHER SPECIFIED POSTPROCEDURAL STATES: Chronic | ICD-10-CM

## 2025-07-15 DIAGNOSIS — Z00.8 ENCOUNTER FOR OTHER GENERAL EXAMINATION: ICD-10-CM

## 2025-07-15 PROCEDURE — A9585: CPT

## 2025-07-15 PROCEDURE — 70553 MRI BRAIN STEM W/O & W/DYE: CPT

## 2025-07-15 PROCEDURE — 70553 MRI BRAIN STEM W/O & W/DYE: CPT | Mod: 26

## 2025-09-17 ENCOUNTER — APPOINTMENT (OUTPATIENT)
Dept: PEDIATRIC NEUROLOGY | Facility: CLINIC | Age: 20
End: 2025-09-17

## 2025-09-17 VITALS
DIASTOLIC BLOOD PRESSURE: 65 MMHG | HEIGHT: 63 IN | BODY MASS INDEX: 16.57 KG/M2 | WEIGHT: 93.5 LBS | HEART RATE: 81 BPM | SYSTOLIC BLOOD PRESSURE: 102 MMHG

## 2025-09-17 DIAGNOSIS — G40.219 LOCALIZATION-RELATED (FOCAL) (PARTIAL) SYMPTOMATIC EPILEPSY AND EPILEPTIC SYNDROMES WITH COMPLEX PARTIAL SEIZURES, INTRACTABLE, W/OUT STATUS EPILEPTICUS: ICD-10-CM

## (undated) DEVICE — WARMING BLANKET UNDERBODY PEDS 36 X 33"

## (undated) DEVICE — SUT MONOCRYL 4-0 27" PS-2 UNDYED

## (undated) DEVICE — BIPOLAR FORCEP STRYKER STANDARD 7" X 1MM (YELLOW)

## (undated) DEVICE — DRSG TELFA 3 X 8

## (undated) DEVICE — NEPTUNE II 4-PORT MANIFOLD

## (undated) DEVICE — DRAPE 3/4 SHEET 52X76"

## (undated) DEVICE — STAPLER SKIN VISI-STAT 35 WIDE

## (undated) DEVICE — DRSG TAPE HYPAFIX 4"

## (undated) DEVICE — GOWN XL

## (undated) DEVICE — DRILL TWIST 2.2MM

## (undated) DEVICE — Device

## (undated) DEVICE — DRSG TAPE UMBILICAL COTTON 2" X 30 X 1/8"

## (undated) DEVICE — SKIN STAPLE REMOVAL

## (undated) DEVICE — WARMING BLANKET LOWER ADULT

## (undated) DEVICE — SYR LUER LOK 20CC

## (undated) DEVICE — ROSA DRAPE ARM

## (undated) DEVICE — PREP DURAPREP 26CC

## (undated) DEVICE — CATH IV SAFE INSYTE 14G X 1.75" (ORANGE)

## (undated) DEVICE — ELCTR STRYKER NEPTUNE SMOKE EVACUATION PENCIL (GREEN)

## (undated) DEVICE — ELCTR BOVIE TIP BLADE INSULATED 2.75" EDGE

## (undated) DEVICE — DRSG CURITY GAUZE SPONGE 4 X 4" 12-PLY

## (undated) DEVICE — SOL IRR POUR NS 0.9% 1000ML

## (undated) DEVICE — PREP BETADINE KIT

## (undated) DEVICE — STAPLER SKIN MULTI DIRECTION W35

## (undated) DEVICE — NDL HYPO SAFE 18G X 1.5" (PINK)

## (undated) DEVICE — BIPOLAR FORCEP STRYKER STANDARD 8" X 1MM (YELLOW)

## (undated) DEVICE — LABELS BLANK W PEN

## (undated) DEVICE — ELCTR BOVIE TIP NEEDLE INSULATED 2.8" EDGE

## (undated) DEVICE — BIPOLAR FORCEP STRYKER STANDARD 7" X 0.5MM (YELLOW)

## (undated) DEVICE — DRSG XEROFORM 1 X 8"

## (undated) DEVICE — WARMING BLANKET FULL ADULT

## (undated) DEVICE — WARMING BLANKET LOWER PEDS

## (undated) DEVICE — DRAPE SURGICAL #1010

## (undated) DEVICE — PACK NEURO MINOR

## (undated) DEVICE — MARKING PEN W RULER

## (undated) DEVICE — GLV 8 PROTEXIS (WHITE)

## (undated) DEVICE — DRILL BIT STRYKER PRECISION NEURO MATCH HEAD 3MM

## (undated) DEVICE — SOL IRR POUR H2O 500ML

## (undated) DEVICE — DRAPE TOWEL BLUE STICKY

## (undated) DEVICE — SUT SILK 3-0 24" TIES

## (undated) DEVICE — STYLET GUIDED FOR DEPTH PLACEMENT

## (undated) DEVICE — DRAPE TOWEL BLUE 17" X 24"

## (undated) DEVICE — NEPTUNE 4-PORT MANIFOLD STANDARD

## (undated) DEVICE — SUTURE REMOVAL KIT

## (undated) DEVICE — DRAPE SPLIT SHEET 77" X 120"

## (undated) DEVICE — SUT MONOCRYL 5-0 18" P-3 UNDYED